# Patient Record
Sex: FEMALE | Race: WHITE | NOT HISPANIC OR LATINO | URBAN - METROPOLITAN AREA
[De-identification: names, ages, dates, MRNs, and addresses within clinical notes are randomized per-mention and may not be internally consistent; named-entity substitution may affect disease eponyms.]

---

## 2017-04-18 ENCOUNTER — TELEPHONE (OUTPATIENT)
Dept: TRANSPLANT | Facility: CLINIC | Age: 27
End: 2017-04-18

## 2017-04-18 NOTE — TELEPHONE ENCOUNTER
"Living Kidney Donor Evaluation Completed: 2017 16:50:58 CT Updated: 2017 09:37:55 CT  Donor Name: Roberta Penn MRN: Note: : 1990 Age: 26Gender: Female Donor Height: 5  7\" Weight (lb): 180 BMI: 28.2  Donor Race:  Ethnicity: Not / Donor Preferred Language: English  Required?: No Current Marital Status: Single  Demographics: Home Address: [address] [place] [address] City: [facility] State: [place] Zip: [address] Country: [place]  Best Phone: [phone]  Alt Phone: Donor Email: Best Phone Type: Mobile Alt Phone Type:   Preferred Contact Time(s): 09:00 AM-11:00 AM, 11:00 AM-1:00 PM, 1:00 PM-4:00 PM Preferred Contact Day(s): Mon, Tue, Wed, Thur, Fri  Donor Screen: PASSED Donor Referred by: Self Researched Donor self reported ABO: AB  Recipient Information: Recipient Name (Last, First): ALTRUISTIC,  ALTRUISTIC Recipient :    ... Donor Relationship: N/A Recipient Diagnosis: Recipient ABO:   MEDICAL HISTORY:  Bursitis/Tendonitis NOS  Ovarian Cysts  MEDICATIONS:  \"Loryna\"  SURGICAL HISTORY:  None Reported  ALLERGIES:  NKDA  SOCIAL HISTORY:  EtOH: Rare (1-2 drinks/month)  Illicit Drug Use: Denies  Tobacco: Denies  SELF-REPORTED FUNCTIONAL STATUS:  \"I am able to participate in strenuous sports such as swimming, singles tennis, football, basketball, or skiing\"  Exercise (3 X per week)  REVIEW OF ORGAN SYSTEMS: Airway or Lungs: No Blood Disorder: No Cancer: No Diabetes,Thyroid,Adrenal,Endocrine Disorder: No Digestive or Liver: No Female Health: Yes Heart or Circulatory System: No Immune Diseases: No Kidneys and Bladder: No Muscles,Bones,Joints: Yes Neuro: No Psych: No  FAMILY HISTORY: Confirmed:  Cancer (Aunt or Uncle, Grandparent)  Heart Disease (Grandparent)  Hypertension (Grandparent)  Denied:  Diabetes (denies)  Kidney Disease (denies)  Kidney Stones (denies)  DONOR INFORMATION:  Level of Education: College or baccalaureate degree complete Employment Status: Full Time " Employer: [employer] Medical Insurance Status: Has medical insurance Current Accommodation: Lives in rented accommodation Living Arrangement: With no relatives, residential arrangement Allow Disclosure to Recipient: Yes Paired Kidney Exchange Education Level: General idea Paired Kidney Exchange Participation Consent: Yes, for a better match Donor Motivation: Highly motivated donor  HIGH RISK BEHAVIOR:  Blood transfusion < 12 months. (NO)  Commercial sex < 12 months. (NO)  Illicit IV drug use < 5yrs. (NO)  Other high risk sexual contact < 12 months. (NO)  EMERGENCY CONTACT INFORMATION:  Primary Secondary First Name: Fang Last Name:  Phone Number: [phone] Relationship: Sibling  First Name: Bree  Last Name:  Phone Number: [phone] Relationship: Sibling  REASON FOR DONATION:   I believe everyone deserves the opportunity to live a full and healthy life.  If I'm able to help someone, I want to do so.   PHYSICIAN CONTACT INFORMATION:  PCP  Name: [facility]   City: [place] State: [place]  Phone: [phone]

## 2017-04-19 ENCOUNTER — TELEPHONE (OUTPATIENT)
Dept: TRANSPLANT | Facility: CLINIC | Age: 27
End: 2017-04-19

## 2017-04-19 NOTE — TELEPHONE ENCOUNTER
Discussed with Roberta her abo AB and understands she may not move fast but still wants to try.Pkt/orders sent.

## 2017-04-19 NOTE — TELEPHONE ENCOUNTER
Left message asking Roberta to return call for screening. Wants to be a NDD. Is abo AB so will discuss issues with that. Will now send all Phase 1 orders with NDD donor pkt.

## 2017-06-02 ENCOUNTER — DOCUMENTATION ONLY (OUTPATIENT)
Dept: TRANSPLANT | Facility: CLINIC | Age: 27
End: 2017-06-02

## 2017-06-05 ENCOUNTER — TELEPHONE (OUTPATIENT)
Dept: TRANSPLANT | Facility: CLINIC | Age: 27
End: 2017-06-05

## 2017-06-05 NOTE — TELEPHONE ENCOUNTER
Informed Roberta her Phase 1's are OK. Average VOD=657. Discussed her blood type of AB and discussed again that it may not move too fast and understands. Wants to come for eval. Has CD. [place] not left [place] in past 3 months.

## 2017-06-06 DIAGNOSIS — Z00.5 TRANSPLANT DONOR EVALUATION: ICD-10-CM

## 2017-06-12 ASSESSMENT — ENCOUNTER SYMPTOMS
ARTHRALGIAS: 1
MYALGIAS: 0
JOINT SWELLING: 0
MUSCLE WEAKNESS: 0
STIFFNESS: 0
BACK PAIN: 0
NECK PAIN: 0
MUSCLE CRAMPS: 0

## 2017-06-20 DIAGNOSIS — Z00.5 EXAMINATION OF POTENTIAL DONOR OF ORGAN AND TISSUE: Primary | ICD-10-CM

## 2017-06-22 ENCOUNTER — OFFICE VISIT (OUTPATIENT)
Dept: NEUROPSYCHOLOGY | Facility: CLINIC | Age: 27
End: 2017-06-22

## 2017-06-22 DIAGNOSIS — Z00.5 TRANSPLANT DONOR EVALUATION: ICD-10-CM

## 2017-06-22 DIAGNOSIS — Z13.31 SCREENING FOR DEPRESSION: Primary | ICD-10-CM

## 2017-06-22 NOTE — MR AVS SNAPSHOT
After Visit Summary   6/22/2017    Roberta Penn    MRN: [id number]           Patient Information     Date Of Birth          1990        Visit Information        Provider Department      6/22/2017 1:15 PM [facility]        Today's Diagnoses     Screening for depression    -  1    Transplant donor evaluation           Follow-ups after your visit        Who to contact     Please call your clinic at [phone] to:    Ask questions about your health    Make or cancel appointments    Discuss your medicines    Learn about your test results    Speak to your doctor   If you have compliments or concerns about an experience at your clinic, or if you wish to file a complaint, please contact [facility] Patient Relations at [phone] or email us at [email or web address]         Additional Information About Your Visit        MyChart Information     EmboMedicst gives you secure access to your electronic health record. If you see a primary care provider, you can also send messages to your care team and make appointments. If you have questions, please call your primary care clinic.  If you do not have a primary care provider, please call [phone] and they will assist you.      Pogoplug is an electronic gateway that provides easy, online access to your medical records. With Pogoplug, you can request a clinic appointment, read your test results, renew a prescription or communicate with your care team.     To access your existing account, please contact your [facility] or call [phone] for assistance.        Care EveryWhere ID     This is your Care EveryWhere ID. This could be used by other organizations to access your [place] medical records  [id number]         Blood Pressure from Last 3 Encounters:   06/23/17 120/78   06/23/17 120/84   06/23/17 122/72    Weight from Last 3 Encounters:   06/23/17 88.4 kg  (194 lb 14.4 oz)              We Performed the Following     [id number] TEST BY PSYCHOLOGIST/MD, PER HR     C PSYCHIATRIC DIAGNOSTIC EVALUATION     PSYCHOLOGY REFERRAL        Primary Care Provider    Provider Unknown       No address on file        Equal Access to Services     JOVANNYEMMA AYLA : Hadii aad ku hadmaishatim Ginarashmi, dinah toñitotomy, lazaro kacarli yessilance, elizabeth gustavoin hayaaanshu dicksonrob connor laVinevelio stern. So [facility] [phone].    ATENCIÓN: Si habla español, tiene a zhao disposición servicios gratuitos de asistencia lingüística. Llame al [phone].    We comply with applicable federal civil rights laws and [place] laws. We do not discriminate on the basis of race, color, national origin, age, disability sex, sexual orientation or gender identity.            Thank you!     Thank you for choosing [facility] NEUROPSYCHOLOGY  for your care. Our goal is always to provide you with excellent care. Hearing back from our patients is one way we can continue to improve our services. Please take a few minutes to complete the written survey that you may receive in the mail after your visit with us. Thank you!             Your Updated Medication List - Protect others around you: Learn how to safely use, store and throw away your medicines at www.disposemymeds.org.      Notice  As of 6/22/2017 11:59 PM    You have not been prescribed any medications.

## 2017-06-23 ENCOUNTER — INFUSION THERAPY VISIT (OUTPATIENT)
Dept: INFUSION THERAPY | Facility: CLINIC | Age: 27
End: 2017-06-23
Attending: INTERNAL MEDICINE

## 2017-06-23 ENCOUNTER — ALLIED HEALTH/NURSE VISIT (OUTPATIENT)
Dept: TRANSPLANT | Facility: CLINIC | Age: 27
End: 2017-06-23
Attending: TRANSPLANT SURGERY

## 2017-06-23 ENCOUNTER — OFFICE VISIT (OUTPATIENT)
Dept: NEPHROLOGY | Facility: CLINIC | Age: 27
End: 2017-06-23
Attending: TRANSPLANT SURGERY

## 2017-06-23 ENCOUNTER — OFFICE VISIT (OUTPATIENT)
Dept: TRANSPLANT | Facility: CLINIC | Age: 27
End: 2017-06-23
Attending: TRANSPLANT SURGERY

## 2017-06-23 VITALS
WEIGHT: 194.9 LBS | HEIGHT: 67 IN | RESPIRATION RATE: 20 BRPM | BODY MASS INDEX: 30.59 KG/M2 | TEMPERATURE: 98.6 F | OXYGEN SATURATION: 97 % | HEART RATE: 100 BPM | DIASTOLIC BLOOD PRESSURE: 72 MMHG | SYSTOLIC BLOOD PRESSURE: 122 MMHG

## 2017-06-23 VITALS — DIASTOLIC BLOOD PRESSURE: 84 MMHG | SYSTOLIC BLOOD PRESSURE: 120 MMHG

## 2017-06-23 VITALS — SYSTOLIC BLOOD PRESSURE: 120 MMHG | DIASTOLIC BLOOD PRESSURE: 78 MMHG

## 2017-06-23 DIAGNOSIS — Z00.5 TRANSPLANT DONOR EVALUATION: ICD-10-CM

## 2017-06-23 DIAGNOSIS — Z00.5 TRANSPLANT DONOR EVALUATION: Primary | ICD-10-CM

## 2017-06-23 DIAGNOSIS — Z00.5 EXAMINATION OF POTENTIAL DONOR OF ORGAN AND TISSUE: Primary | ICD-10-CM

## 2017-06-23 DIAGNOSIS — E66.811 OBESITY (BMI 30.0-34.9): ICD-10-CM

## 2017-06-23 DIAGNOSIS — Z00.5 EXAMINATION OF POTENTIAL DONOR OF ORGAN AND TISSUE: ICD-10-CM

## 2017-06-23 LAB
ABO + RH BLD: NORMAL
ABO + RH BLD: NORMAL
ALBUMIN SERPL-MCNC: 3.2 G/DL (ref 3.4–5)
ALBUMIN UR-MCNC: NEGATIVE MG/DL
ALP SERPL-CCNC: 68 U/L (ref 40–150)
ALT SERPL W P-5'-P-CCNC: 18 U/L (ref 0–50)
ANION GAP SERPL CALCULATED.3IONS-SCNC: 10 MMOL/L (ref 3–14)
APPEARANCE UR: ABNORMAL
APTT PPP: 26 SEC (ref 22–37)
AST SERPL W P-5'-P-CCNC: 16 U/L (ref 0–45)
B-HCG SERPL-ACNC: <1 IU/L (ref 0–5)
BACTERIA #/AREA URNS HPF: ABNORMAL /HPF
BILIRUB SERPL-MCNC: 0.5 MG/DL (ref 0.2–1.3)
BILIRUB UR QL STRIP: NEGATIVE
BLD GP AB SCN SERPL QL: NORMAL
BLOOD BANK CMNT PATIENT-IMP: NORMAL
BUN SERPL-MCNC: 13 MG/DL (ref 7–30)
CALCIUM SERPL-MCNC: 8.3 MG/DL (ref 8.5–10.1)
CHLORIDE SERPL-SCNC: 106 MMOL/L (ref 94–109)
CHOLEST SERPL-MCNC: 162 MG/DL
CMV IGG SERPL QL IA: NORMAL AI (ref 0–0.8)
CO2 SERPL-SCNC: 20 MMOL/L (ref 20–32)
COLOR UR AUTO: YELLOW
CREAT SERPL-MCNC: 0.54 MG/DL (ref 0.52–1.04)
CREAT UR-MCNC: 249 MG/DL
EBV VCA IGG SER QL IA: ABNORMAL AI (ref 0–0.8)
EBV VCA IGM SER QL IA: NORMAL AI (ref 0–0.8)
ERYTHROCYTE [DISTWIDTH] IN BLOOD BY AUTOMATED COUNT: 13 % (ref 10–15)
GFR SERPL CREATININE-BSD FRML MDRD: ABNORMAL ML/MIN/1.7M2
GLUCOSE SERPL-MCNC: 98 MG/DL (ref 70–99)
GLUCOSE UR STRIP-MCNC: NEGATIVE MG/DL
HBA1C MFR BLD: 5.6 % (ref 4.3–6)
HBV CORE AB SERPL QL IA: NONREACTIVE
HBV SURFACE AB SERPL IA-ACNC: 33.14 M[IU]/ML
HBV SURFACE AG SERPL QL IA: NONREACTIVE
HCT VFR BLD AUTO: 38.8 % (ref 35–47)
HCV AB SERPL QL IA: NORMAL
HDLC SERPL-MCNC: 71 MG/DL
HGB BLD-MCNC: 12.4 G/DL (ref 11.7–15.7)
HGB UR QL STRIP: NEGATIVE
HIV 1+2 AB+HIV1 P24 AG SERPL QL IA: NORMAL
INR PPP: 1.01 (ref 0.86–1.14)
KETONES UR STRIP-MCNC: NEGATIVE MG/DL
LDLC SERPL CALC-MCNC: 53 MG/DL
LEUKOCYTE ESTERASE UR QL STRIP: ABNORMAL
MCH RBC QN AUTO: 28 PG (ref 26.5–33)
MCHC RBC AUTO-ENTMCNC: 32 G/DL (ref 31.5–36.5)
MCV RBC AUTO: 88 FL (ref 78–100)
MICROALBUMIN UR-MCNC: 12 MG/L
MICROALBUMIN/CREAT UR: 4.9 MG/G CR (ref 0–25)
MUCOUS THREADS #/AREA URNS LPF: PRESENT /LPF
NITRATE UR QL: NEGATIVE
NONHDLC SERPL-MCNC: 91 MG/DL
PH UR STRIP: 5 PH (ref 5–7)
PHOSPHATE SERPL-MCNC: 3 MG/DL (ref 2.5–4.5)
PLATELET # BLD AUTO: 256 10E9/L (ref 150–450)
POTASSIUM SERPL-SCNC: 3.9 MMOL/L (ref 3.4–5.3)
PROT SERPL-MCNC: 7.4 G/DL (ref 6.8–8.8)
PROT UR-MCNC: 0.12 G/L
PROT/CREAT 24H UR: 0.05 G/G CR (ref 0–0.2)
RBC # BLD AUTO: 4.43 10E12/L (ref 3.8–5.2)
RBC #/AREA URNS AUTO: 3 /HPF (ref 0–2)
SODIUM SERPL-SCNC: 136 MMOL/L (ref 133–144)
SP GR UR STRIP: 1.03 (ref 1–1.03)
SPECIMEN EXP DATE BLD: NORMAL
SQUAMOUS #/AREA URNS AUTO: 2 /HPF (ref 0–1)
T PALLIDUM IGG+IGM SER QL: NEGATIVE
TRIGL SERPL-MCNC: 187 MG/DL
URATE SERPL-MCNC: 5.3 MG/DL (ref 2.6–6)
URN SPEC COLLECT METH UR: ABNORMAL
UROBILINOGEN UR STRIP-MCNC: 0 MG/DL (ref 0–2)
WBC # BLD AUTO: 8.1 10E9/L (ref 4–11)
WBC #/AREA URNS AUTO: 6 /HPF (ref 0–2)

## 2017-06-23 PROCEDURE — 86704 HEP B CORE ANTIBODY TOTAL: CPT | Performed by: INTERNAL MEDICINE

## 2017-06-23 PROCEDURE — 84550 ASSAY OF BLOOD/URIC ACID: CPT | Performed by: INTERNAL MEDICINE

## 2017-06-23 PROCEDURE — 84156 ASSAY OF PROTEIN URINE: CPT | Performed by: INTERNAL MEDICINE

## 2017-06-23 PROCEDURE — 86850 RBC ANTIBODY SCREEN: CPT | Performed by: INTERNAL MEDICINE

## 2017-06-23 PROCEDURE — 80053 COMPREHEN METABOLIC PANEL: CPT | Performed by: INTERNAL MEDICINE

## 2017-06-23 PROCEDURE — 87340 HEPATITIS B SURFACE AG IA: CPT | Performed by: INTERNAL MEDICINE

## 2017-06-23 PROCEDURE — 84100 ASSAY OF PHOSPHORUS: CPT | Performed by: INTERNAL MEDICINE

## 2017-06-23 PROCEDURE — 81001 URINALYSIS AUTO W/SCOPE: CPT | Performed by: INTERNAL MEDICINE

## 2017-06-23 PROCEDURE — 86480 TB TEST CELL IMMUN MEASURE: CPT | Performed by: INTERNAL MEDICINE

## 2017-06-23 PROCEDURE — 84702 CHORIONIC GONADOTROPIN TEST: CPT | Performed by: INTERNAL MEDICINE

## 2017-06-23 PROCEDURE — 85610 PROTHROMBIN TIME: CPT | Performed by: INTERNAL MEDICINE

## 2017-06-23 PROCEDURE — 86803 HEPATITIS C AB TEST: CPT | Performed by: INTERNAL MEDICINE

## 2017-06-23 PROCEDURE — 82542 COL CHROMOTOGRAPHY QUAL/QUAN: CPT | Performed by: INTERNAL MEDICINE

## 2017-06-23 PROCEDURE — 40000269 ZZH STATISTIC NO CHARGE FACILITY FEE: Mod: ZF

## 2017-06-23 PROCEDURE — 83036 HEMOGLOBIN GLYCOSYLATED A1C: CPT | Performed by: INTERNAL MEDICINE

## 2017-06-23 PROCEDURE — 85730 THROMBOPLASTIN TIME PARTIAL: CPT | Performed by: INTERNAL MEDICINE

## 2017-06-23 PROCEDURE — 86900 BLOOD TYPING SEROLOGIC ABO: CPT | Performed by: INTERNAL MEDICINE

## 2017-06-23 PROCEDURE — 85027 COMPLETE CBC AUTOMATED: CPT | Performed by: INTERNAL MEDICINE

## 2017-06-23 PROCEDURE — 80061 LIPID PANEL: CPT | Performed by: INTERNAL MEDICINE

## 2017-06-23 PROCEDURE — 86665 EPSTEIN-BARR CAPSID VCA: CPT | Performed by: INTERNAL MEDICINE

## 2017-06-23 PROCEDURE — 36415 COLL VENOUS BLD VENIPUNCTURE: CPT | Performed by: INTERNAL MEDICINE

## 2017-06-23 PROCEDURE — 86644 CMV ANTIBODY: CPT | Performed by: INTERNAL MEDICINE

## 2017-06-23 PROCEDURE — 82043 UR ALBUMIN QUANTITATIVE: CPT | Performed by: INTERNAL MEDICINE

## 2017-06-23 PROCEDURE — 86780 TREPONEMA PALLIDUM: CPT | Performed by: INTERNAL MEDICINE

## 2017-06-23 PROCEDURE — 40000866 ZZHCL STATISTIC HIV 1/2 ANTIGEN/ANTIBODY PRETRANSPLANT ONLY: Performed by: INTERNAL MEDICINE

## 2017-06-23 PROCEDURE — 86901 BLOOD TYPING SEROLOGIC RH(D): CPT | Performed by: INTERNAL MEDICINE

## 2017-06-23 PROCEDURE — 25000128 H RX IP 250 OP 636: Mod: JW,ZF | Performed by: INTERNAL MEDICINE

## 2017-06-23 PROCEDURE — 86706 HEP B SURFACE ANTIBODY: CPT | Performed by: INTERNAL MEDICINE

## 2017-06-23 RX ORDER — DROSPIRENONE AND ETHINYL ESTRADIOL 0.02-3(28)
1 KIT ORAL DAILY
COMMUNITY

## 2017-06-23 RX ADMIN — IOHEXOL 5 ML: 300 INJECTION, SOLUTION INTRAVENOUS at 09:12

## 2017-06-23 ASSESSMENT — PAIN SCALES - GENERAL: PAINLEVEL: NO PAIN (0)

## 2017-06-23 NOTE — PROGRESS NOTES
RE: Roberta Penn  [facility] #[id number]             I saw your patient, Roberta Penn, in consultation in our pretransplant clinic.  She was here at clinic for evaluation as a possible kidney donor.  She presented to clinic with her mother. Our donor coordinator shared our center-specific results with her.  We discussed:    (1) The risks of donation--including mortality (0.03% risk) and morbidity (approximately 1% risk of major morbidity).  We discussed the major reported causes of death after kidney donation (bleeding, infection, pulmonary embolism).  We discussed potential complications:  bleeding requiring reoperation, infection requiring reoperation, pneumonia, bowel obstruction, infection at the site of the incision, hernia at the site of the incision, deep vein thrombosis, deep vein thrombosis with associated pulmonary embolism, and urinary tract infection.  I told her I could not possibly name all of the risks, but that she was at risk for any complications that could occur in any operation.     We also discussed the long-term risks of living with one kidney.  We discussed the rare diseases that might affect only one kidney.  We talked about the new publications suggesting slightly increased long-term risk of ESRD after donor nephrectomy.  For people her age, there is really no data to show what the outcome will be at 50-60 years.  This is an important consideration.  We discussed the rare diseases that might affect having only one kidney.    (2) The hospital stay and the fact that if all goes well, discharge would occur within two to three days after donor nephrectomy.  We also discussed the fact that there would be no diet or fluid restrictions (again if all went well), and that the only new medication that he would be on would be pain medication.  We discussed the fact that most patients are on Tylenol or something similar within five to six days of surgery.      (3) The recovery time after surgery and the  "restrictions that are necessary: a) no driving for a couple of weeks (or until she felt that his/her reaction would be adequate if she had to slam on the brakes; and b) no lifting over 10 pounds or any exercise that would stretch her abdominal muscles for six weeks (to allow the muscles to heal so that she doesn't develop a hernia).  We also discussed return to work, which could occur in approximately three to four weeks if she had a desk job or would require not returning to work for six weeks if the job required any heavy lifting or exercise.  We discussed the potential for not getting her pep and energy back for anywhere from two to six weeks after surgery and how there was a tremendous individual variation in return of full energy level.    (4) The concern about long distance travel for the first couple of weeks post-donation.  We discussed the risks of deep vein thrombosis associated with plane or car travel.  I recommended that, if flying, she should get up and walk around every 30-40 minutes; if driving she should ask the  to stop the car every 30-45 minutes so Ms. Penn could take a short walk.    (5) The fact that the recipient could be on a waiting for  donor transplant and would ultimately receive a kidney if Ms. Penn did not donate.      (6) The difference between nondirected and directed donors.  I explained our policy of allocation of nondirected kidneys to the number one person on the list.  I also explained that by being a nondirected donor, they would not have the opportunity to meet the recipient (at least for the first six months) and, therefore, would not have the opportunity to see the potential \"benefit\" of the donation.  I also mentioned that our recipients often have not sent \"thank-you\" letters to their nondirected donors.  This would be an important thing to recognize as she went forward in thinking about nondirected donation.  Finally, we discussed the option of entering the " paired exchange pool--its advantage, increasing the number of transplants from the donation; the disadvantage, harder to schedule and more logistics.     We also discussed potential risks associated with future pregnancies.  We discussed the literature about pregnancy after donation, and also our local data.  I told her that data showed that the risks of post donation pregnancies were similar to the risks reported in the general population.  I also discussed our data on outcomes of pregnancies in women who had had pregnancies both pre- and post-donation.    Finally, the mother had numerous questions about the risks: the long-term outcome, the survival and rejection rate of transplantation in general, and the income of donor age on longevity of a transplant. I answered these as well as other questions.     I attempted to answer any remaining questions.  I also told her that should she have any questions, she should feel free to contact us.  We would be glad to answer any questions either over the phone or at another clinic visit.  Her transplant coordinator is Marti Shin and may be reached at [phone].  Thank you for the opportunity to see her.    I spent 70 minutes with this patient.  Over 95% of that time was spent in counseling and coordination of care.             Yours truly,               Melecio Marrero MD         Professor of Surgery         ([phone])      KRISS/

## 2017-06-23 NOTE — PROGRESS NOTES
Patient attended all appointments and completed all scheduled tests.  Patient to follow up with Transplant Coordinator.  Patient stated understanding and has contact numbers.    +4hour Iohexol was drawn at 1335. Was due at 1320. Unable to obtain blood from IV site. Lab called and blood draw done from this patients hand.

## 2017-06-23 NOTE — MR AVS SNAPSHOT
After Visit Summary   6/23/2017    Roberta Penn    MRN: [id number]           Patient Information     Date Of Birth          1990        Visit Information        Provider Department      6/23/2017 8:00 AM  TXC EVALUATION [facility] Solid Organ Transplant        Today's Diagnoses     Examination of potential donor of organ and tissue    -  1       Follow-ups after your visit        Your next 10 appointments already scheduled     Jun 23, 2017  2:15 PM CDT   (Arrive by 2:00 PM)   XR CHEST 2 VIEWS with UCXR1   [facility] Xray ([facility])    [address] [address]   [phone]           Please bring a list of your current medicines to your exam. (Include vitamins, minerals and over-thecounter medicines.) Leave your valuables at home.  Tell your doctor if there is a chance you may be pregnant.  You do not need to do anything special for this exam.            Jun 23, 2017  2:40 PM CDT   (Arrive by 2:25 PM)   CT RENAL ANGIO with UCCT1   [facility] CT ([facility])    [address] [address] [phone]           Please bring any scans or X-rays taken at other hospitals, if similar tests were done. Also bring a list of your medicines, including vitamins, minerals and over-the-counter drugs. It is safest to leave personal items at home.  Be sure to tell your doctor:   If you have any allergies.   If there s any chance you are pregnant.   If you are breastfeeding.   If you have any special needs.  You will have contrast for this exam. To prepare:   Do not eat or drink for 2 hours before your exam. If you need to take medicine, you may take it with small sips of water. (We may ask you to take liquid medicine as well.)   The day before your exam, drink extra fluids at least six 8-ounce glasses (unless your doctor tells you to restrict your fluids).  Patients over  70 or patients with diabetes or kidney problems:   If you haven t had a blood test (creatinine test) within the last 30 days, go to your clinic or Diagnostic Imaging Department for this test.  If you have diabetes:   If your kidney function is normal, continue taking your metformin (Avandamet, Glucophage, Glucovance, Metaglip) on the day of your exam.   If your kidney function is abnormal, wait 48 hours before restarting this medicine.  Please wear loose clothing, such as a sweat suit or jogging clothes. Avoid snaps, zippers and other metal. We may ask you to undress and put on a hospital gown.  If you have any questions, please call the Imaging Department where you will have your exam.              Who to contact     If you have questions or need follow up information about today's clinic visit or your schedule please contact [facility] SOLID ORGAN TRANSPLANT directly at [phone].  Normal or non-critical lab and imaging results will be communicated to you by ApiFixhart, letter or phone within 4 business days after the clinic has received the results. If you do not hear from us within 7 days, please contact the clinic through TechProcess Solutionst or phone. If you have a critical or abnormal lab result, we will notify you by phone as soon as possible.  Submit refill requests through Ameristream or call your pharmacy and they will forward the refill request to us. Please allow 3 business days for your refill to be completed.          Additional Information About Your Visit        ApiFixharWattpad Information     Ameristream gives you secure access to your electronic health record. If you see a primary care provider, you can also send messages to your care team and make appointments. If you have questions, please call your primary care clinic.  If you do not have a primary care provider, please call [phone] and they will assist you.        Care EveryWhere ID     This is your Care EveryWhere ID. This could be used by other organizations to  "access your [place] medical records  [id number]        Your Vitals Were     Pulse Temperature Respirations Height Pulse Oximetry BMI (Body Mass Index)    100 98.6  F (37  C) (Oral) 20 1.702 m (5' 7\") 97% 30.53 kg/m2       Blood Pressure from Last 3 Encounters:   06/23/17 120/78   06/23/17 120/84   06/23/17 122/72    Weight from Last 3 Encounters:   06/23/17 88.4 kg (194 lb 14.4 oz)              Today, you had the following     No orders found for display       Primary Care Provider    Provider Unknown       No address on file        Equal Access to Services     [facility]: Hadii cori Vicente, washreyada ilya, lazaro kaalmada pao, elizabeth herron . So [facility] [phone].    ATENCIÓN: Si habla español, tiene a zhao disposición servicios gratuitos de asistencia lingüística. Llame al [phone].    We comply with applicable federal civil rights laws and [place] laws. We do not discriminate on the basis of race, color, national origin, age, disability sex, sexual orientation or gender identity.            Thank you!     Thank you for choosing [facility] SOLID ORGAN TRANSPLANT  for your care. Our goal is always to provide you with excellent care. Hearing back from our patients is one way we can continue to improve our services. Please take a few minutes to complete the written survey that you may receive in the mail after your visit with us. Thank you!             Your Updated Medication List - Protect others around you: Learn how to safely use, store and throw away your medicines at www.disposemymeds.org.          This list is accurate as of: 6/23/17  2:04 PM.  Always use your most recent med list.                   Brand Name Dispense Instructions for use Diagnosis    drospirenone-ethinyl estradiol 3-0.02 MG per tablet    SEAMUS     Take 1 tablet by mouth daily          "

## 2017-06-23 NOTE — MR AVS SNAPSHOT
After Visit Summary   6/23/2017    Roberta Penn    MRN: [id number]           Patient Information     Date Of Birth          1990        Visit Information        Provider Department      6/23/2017 12:30 PM Marti Shin S.A., RN [facility] Solid Organ Transplant        Today's Diagnoses     Examination of potential donor of organ and tissue    -  1       Follow-ups after your visit        Who to contact     If you have questions or need follow up information about today's clinic visit or your schedule please contact [facility] SOLID ORGAN TRANSPLANT directly at [phone].  Normal or non-critical lab and imaging results will be communicated to you by EoeMobilehart, letter or phone within 4 business days after the clinic has received the results. If you do not hear from us within 7 days, please contact the clinic through CipherAppst or phone. If you have a critical or abnormal lab result, we will notify you by phone as soon as possible.  Submit refill requests through BYTEGRID or call your pharmacy and they will forward the refill request to us. Please allow 3 business days for your refill to be completed.          Additional Information About Your Visit        MyChart Information     BYTEGRID gives you secure access to your electronic health record. If you see a primary care provider, you can also send messages to your care team and make appointments. If you have questions, please call your primary care clinic.  If you do not have a primary care provider, please call [phone] and they will assist you.        Care EveryWhere ID     This is your Care EveryWhere ID. This could be used by other organizations to access your [place] medical records  [id number]         Blood Pressure from Last 3 Encounters:   06/23/17 120/78   06/23/17 120/84   06/23/17 122/72    Weight from Last 3 Encounters:   06/23/17 88.4 kg (194 lb 14.4 oz)              Today, you had the following     No orders found for display        Primary Care Provider    Provider Unknown       No address on file        Equal Access to Services     [facility] AGUILA : Hadii aad ku hadmaishatim Vicente, markelpidio caraballo, krystenshannon fajardomaelizabeth ugaldeshakiraspenser stern. So [facility] [phone].    ATENCIÓN: Si habla español, tiene a zhao disposición servicios gratuitos de asistencia lingüística. Llame al [phone].    We comply with applicable federal civil rights laws and [place] laws. We do not discriminate on the basis of race, color, national origin, age, disability sex, sexual orientation or gender identity.            Thank you!     Thank you for choosing [facility] SOLID ORGAN TRANSPLANT  for your care. Our goal is always to provide you with excellent care. Hearing back from our patients is one way we can continue to improve our services. Please take a few minutes to complete the written survey that you may receive in the mail after your visit with us. Thank you!             Your Updated Medication List - Protect others around you: Learn how to safely use, store and throw away your medicines at www.disposemymeds.org.          This list is accurate as of: 6/23/17  3:19 PM.  Always use your most recent med list.                   Brand Name Dispense Instructions for use Diagnosis    drospirenone-ethinyl estradiol 3-0.02 MG per tablet    SEAMUS     Take 1 tablet by mouth daily

## 2017-06-23 NOTE — PROGRESS NOTES
NUTRITION KIDNEY DONOR EVALUATION  Medical Nutrition Therapy    Weight and BMI:  Current BMI: 30.5  BMI is outside criteria for kidney donation  BMI Goal <30  Current Weight: 194 lbs  Goal Weight <190 lbs  WEIGHT LOSS NEEDED for BMI <30: 4 lbs    8 Year Risk of Diabetes:  </= 3%       Visit Type: Initial Assessment    Roberta Penn referred by Dr. Marrero for MNT related to potential kidney donor evaluation    Patient accompanied by self    Nutrition Assessment:  Anthropometrics  Height:   67  in   BMI:    30.5    Weight Status:Obesity Grade I BMI 30-34.9   Weight:  194 lbs            IBW (lbs): 135  % IBW: 144    Wt Hx: Pt has gained 50# x 1 year by exercising less.     Adj/dosing BW: 150 lbs/68 kg    Goal wt prior to donation: 190 lbs  (BMI <30 or per MD)          Recent Labs   Lab Test  06/23/17   0831   CHOL  162   HDL  71   LDL  53   TRIG  187*       BG = 98  A1C = 5.6      Prediction of Incident Diabetes Mellitus in Middle-aged Adults: The [place] Offspring Study  Olvin Sifuentes MD; James B. Meigs, MD, MPH; Tashia Guerrero, PhD; Benita Sevilla MD, MPH; Freddy Dickerson MD; Hansel Falcon Sr,   PhD  Pt's estimated risk for T2DM (per Table 6 above)  Pt received points for the following criteria: BMI>25, BMI>30, TG>150  Total points: 10  8-Year risk of T2DM: </= 3%    Nutrition History  Dining out/food not made at home: 3-4x/week  Vitamins, Supplements, Herbals, Pertinent Meds: none  Pt eats very minimal meat (chix, beef, fish, pork), minimal eggs. Most protein is from yogurt, milk, beans, nuts.     Recall:  B: yogurt  L: fruit or salad  D: [employer] food, salad; may have rice/beans at home  Sn: apple, almonds, granola bars  Beverages: water, milk, sparkling water, V8 tomato juice, coffee, occasional energy drinks  ETOH (1 drink = 12 oz beer, 5 oz wine, 1.5 oz liquor): 1 drink/week     Physical Activity  Walking, starting jogging/walking combo; prev used to play tennis and run but also has joint  issues      Nutrition Prescription  Estimated Energy Needs: 6300-8575 kcals (20-25 Kcal/Kg)  Justification: obese    Estimated Protein Needs: 54-68 protein (0.8-1 g pro/Kg)  Justification: maintenance    Estimated Fluid Needs:  (1 mL/Kcal)  Justification: maintenance     Fiber: 25-30 g/day     Nutrition Diagnosis:  Obesity r/t excessive energy intake and inadequate physical activity AEB BMI >30.     Excessive Na+ intake r/t food and nutrition related knowledge deficit AEB diet recall reveals high Na+ foods.    Nutrition Intervention:  Nutrition education provided:  Discussed strategies for wt loss - encouraged increased activity. Reviewed high TG and likely d/t higher carb intake. Provided goal range for protein intake and discussed non-meat protein foods and encouraged trying protein shakes or bars daily to help with weight loss.   Reviewed overall healthy diet guidelines for pre and post kidney donation. Discussed maintenance of a healthy weight, Na+ intake <3000 mg/day, and avoidance of herbal supplements post donation d/t unknown effects on the kidney. Reviewed that current diet is likely ~3000 mg sodium with amount of eating out she does from the deli.     Patient Understanding: Pt verbalized understanding of education provided.  Expected Compliance: Good  Follow-Up Plans: PRN     Nutrition Goals:  1. Weight loss to BMI<30 or <190 lbs or more  2. Na+ <3000 mg/day    Provided pt with contact info.    Time spent with patient: 15 minutes.  Sapna Emery, RD, LD

## 2017-06-23 NOTE — LETTER
6/23/2017       RE: Roberta Penn  [address]  [facility] [address]     Dear Colleague,    Thank you for referring your patient, Roberta Penn, to the [facility] NEPHROLOGY at [facility]. Please see a copy of my visit note below.        Assessment and Plan: 27 yo F who would like to be a nondirected donor.  Takes OCPs for PCOS but no other medications.  Good BP and normal renal function.  Overall a good candidate for donation pending angio results. Issues to address  1.  Obesity.  She has had significant weight gain in one year. Recommend sustained weight loss 15-20 pounds for 6 months prior to donation    Discussed the risks of donating a kidney, including the surgical risk and the possible risks of living with one kidney. Discussed increased risk of preeclampsia.  Pt does not want to become pregnant but says she will consider this.    Education about expected post-donation kidney function and how chronic kidney disease (CKD) and end stage kidney disease (ESKD) might potentially impact the donor in the future, include, but not limited to:       - On average, donors will have 25-35% permanent loss of kidney function at donation.       - Baseline risk of ESKD may slightly exceed that of members of the general population with the same demographic profile.       - Donor risks must be interpreted in light of known epidemiology of both CKD or ESKD, such as that CKD generally develops in midlife (40-50 years old) and ESKD generally develops after age 60.       - Medical evaluation of young potential donors cannot predict lifetime risk of CKD or ESKD.       - Donors may be at higher risk for CKD if they sustain damage to the remaining kidney.       - Development of CKD and progression of ESKD may be more rapid with only 1 kidney.       - Some type of kidney replacement therapy, either kidney transplant or dialysis, is required when reaching ESKD.    Potential medical or surgical  risks include, but not limited to:       - Death.       - Scars, pain, fatigue, and other consequences typical of any surgical procedure.       - Decreased kidney function.       - Abdominal or bowel symptoms, such as bloating and nausea, and developing bowel obstruction.       - Kidney failure (ESKD) and the need for a kidney transplant or dialysis for the donor.       - Impact of obesity, hypertension, or other donor-specific medical conditions on morbidity and mortality of the potential donor.    Patients overall evaluation will be discussed with the transplant team and a final recommendation on the patients' suitability to be a kidney transplant donor will be made at that time.    Consult:  Roberta Penn was seen in consultation at the request of Dr. Marrero for evaluation as a potential kidney transplant donor.    Reason for Visit:  Roberta Penn is a 26 year old female who presents for a kidney donor evaluation.  Patient would like to donate to nondirected donor.    HPI:    Patient is a healthy 27 yo F who learned about donation on line.  She takes no medications. Her only health problem is recent weight gain of 50 pounds in past year.  She used to be a distance runner but has stopped exercising because she is busy at work.         Kidney Disease Hx:       h/o Kidney Problems: No  Family h/o Genetic Kidney Disease: No       h/o HTN: No    Usual BP: 115/70     h/o Protein in Urine: No  h/o Blood in Urine: No       h/o Kidney Stones: No  h/o Kidney Injury: No       h/o Recurrent UTI: No  h/o Genitourinary Problems: No       h/o Chronic NSAID Use: No         Other Medical Hx:       h/o DM: No   h/o Gestational DM: No       h/o Preeclampsia: No          h/o Gastrointestinal, Pancreas or Liver Problems: No       h/o Lung or Heart Problems: No       h/o Hematologic Problems: No  h/o Bleeding or Clotting Problems: No       h/o Cancer: No       h/o Infection Problems: No         Skin Cancer Risk:       h/o more than 50  moles: No       h/o extensive sun exposure: No       h/o melanoma: No       Family h/o melanoma: No         Mental Health Assessment:       h/o Depression: No       h/o Psychiatric Illness: No       h/o Suicidal Attempt(s): No    ROS:   A comprehensive review of systems was obtained and negative, except as noted in the HPI or PMH.    PMH:   History was taken from the patient as noted below.  Past Medical History:   Diagnosis Date     Bursitis 2009     Obesity (BMI 30.0-34.9) 2017    weight gain 50 lb in past one year     Polycystic ovary syndrome 2012    treated with OCPs       PSH:   Past Surgical History:   Procedure Laterality Date     HC TOOTH EXTRACTION W/FORCEP  2002    [place] teeth     Personal or family history of anesthesia problems: No    Family Hx:   Family History   Problem Relation Age of Onset     Pancreatic Cancer Maternal Grandmother      Breast Cancer Maternal Grandmother      Colon Cancer Maternal Grandmother      Hypertension Maternal Grandfather      Obesity Maternal Grandfather      Hypertension Paternal Grandmother           Specific Family Hx:       FH of DM: No       FH of CAD: Yes   FH of HTN: Yes        FH of Cancer: No  FH of Kidney Cancer: No    Personal Hx:   Social History     Social History     Marital status: Single     Spouse name: N/A     Number of children: N/A     Years of education: N/A     Occupational History     Not on file.     Social History Main Topics     Smoking status: Never Smoker     Smokeless tobacco: Not on file     Alcohol use 0.6 oz/week     1 Standard drinks or equivalent per week     Drug use: No     Sexual activity: Not on file     Other Topics Concern     Not on file     Social History Narrative          Specific Social Hx:       Health Insurance Status: Yes       Employment Status: Full time  Occupation: Employed at [employer]                       Living Arrangements:don't know       Social Support: Yes       Presence of increased risk for disease  "transmission behaviors as defined by [facility] guidelines: No        Allergies:  No Known Allergies    Medications:  Prior to Admission medications    Medication Sig Start Date End Date Taking? Authorizing Provider   drospirenone-ethinyl estradiol (SEAMUS) 3-0.02 MG per tablet Take 1 tablet by mouth daily    Reported, Patient       Vitals:  Vital Signs 6/23/2017 6/23/2017 6/23/2017   Systolic 122 120 120   Diastolic 72 78 84   Pulse 100 - -   Temperature 98.6 - -   Respirations 20 - -   Weight (LB) 194 lb 14.4 oz - -   Height 5' 7\" - -   BMI (Calculated) 30.59 - -   Pain - - -   O2 97 - -       Exam:   GENERAL APPEARANCE: alert and no distress  HENT: mouth without ulcers or lesions  LYMPHATICS: no cervical or supraclavicular nodes  RESP: lungs clear to auscultation - no rales, rhonchi or wheezes  CV: regular rhythm, normal rate, no rub, no murmur  EDEMA: no LE edema bilaterally  ABDOMEN: soft, nondistended, nontender, bowel sounds normal. No bruits over kidneys  MS: extremities normal - no gross deformities noted, no evidence of inflammation in joints, no muscle tenderness  SKIN: no rash    Results:   Labs and imaging were ordered for this visit and reviewed by me.  Recent Results (from the past 336 hour(s))   ABO/Rh type and screen    Collection Time: 06/23/17  8:31 AM   Result Value Ref Range    ABO AB     RH(D)  Neg     Antibody Screen Neg     Test Valid Only At       [facility]    Specimen Expires 06/26/2017    CMV Antibody IgG    Collection Time: 06/23/17  8:31 AM   Result Value Ref Range    CMV Antibody IgG  0.0 - 0.8 AI     <0.2  Negative   Antibody index (AI) values reflect qualitative changes in antibody   concentration that cannot be directly associated with clinical condition or   disease state.     EBV Capsid Antibody IgG    Collection Time: 06/23/17  8:31 AM   Result Value Ref Range    EBV Capsid Antibody IgG (H) 0.0 - 0.8 AI     >8.0  Positive, suggests recent or " past exposure   Antibody index (AI) values reflect qualitative changes in antibody   concentration that cannot be directly associated with clinical condition or   disease state.     Hepatitis B core antibody    Collection Time: 06/23/17  8:31 AM   Result Value Ref Range    Hepatitis B Core Caty Nonreactive NR   Hepatitis B Surface Antibody    Collection Time: 06/23/17  8:31 AM   Result Value Ref Range    Hepatitis B Surface Antibody 33.14 (H) <8.00 m[IU]/mL   Hepatitis B surface antigen    Collection Time: 06/23/17  8:31 AM   Result Value Ref Range    Hep B Surface Agn Nonreactive NR   Hepatitis C antibody    Collection Time: 06/23/17  8:31 AM   Result Value Ref Range    Hepatitis C Antibody  NR     Nonreactive   Assay performance characteristics have not been established for newborns,   infants, and children     HIV Antigen Antibody Combo Pretransplant    Collection Time: 06/23/17  8:31 AM   Result Value Ref Range    HIV Antigen Antibody Combo Pretransplant  NR     Nonreactive   HIV-1 p24 Ag & HIV-1/HIV-2 Ab Not Detected     Anti Treponema    Collection Time: 06/23/17  8:31 AM   Result Value Ref Range    Treponema pallidum Antibody Negative NEG   Lipid Profile    Collection Time: 06/23/17  8:31 AM   Result Value Ref Range    Cholesterol 162 <200 mg/dL    Triglycerides 187 (H) <150 mg/dL    HDL Cholesterol 71 >49 mg/dL    LDL Cholesterol Calculated 53 <100 mg/dL    Non HDL Cholesterol 91 <130 mg/dL   Comprehensive metabolic panel    Collection Time: 06/23/17  8:31 AM   Result Value Ref Range    Sodium 136 133 - 144 mmol/L    Potassium 3.9 3.4 - 5.3 mmol/L    Chloride 106 94 - 109 mmol/L    Carbon Dioxide 20 20 - 32 mmol/L    Anion Gap 10 3 - 14 mmol/L    Glucose 98 70 - 99 mg/dL    Urea Nitrogen 13 7 - 30 mg/dL    Creatinine 0.54 0.52 - 1.04 mg/dL    GFR Estimate >90  Non  GFR Calc   >60 mL/min/1.7m2    GFR Estimate If Black >90   GFR Calc   >60 mL/min/1.7m2    Calcium 8.3 (L) 8.5 -  10.1 mg/dL    Bilirubin Total 0.5 0.2 - 1.3 mg/dL    Albumin 3.2 (L) 3.4 - 5.0 g/dL    Protein Total 7.4 6.8 - 8.8 g/dL    Alkaline Phosphatase 68 40 - 150 U/L    ALT 18 0 - 50 U/L    AST 16 0 - 45 U/L   HCG quantitative pregnancy    Collection Time: 06/23/17  8:31 AM   Result Value Ref Range    HCG Quantitative Serum <1 0 - 5 IU/L   Phosphorus    Collection Time: 06/23/17  8:31 AM   Result Value Ref Range    Phosphorus 3.0 2.5 - 4.5 mg/dL   Hemoglobin A1c    Collection Time: 06/23/17  8:31 AM   Result Value Ref Range    Hemoglobin A1C 5.6 4.3 - 6.0 %   INR    Collection Time: 06/23/17  8:31 AM   Result Value Ref Range    INR 1.01 0.86 - 1.14   Partial thromboplastin time    Collection Time: 06/23/17  8:31 AM   Result Value Ref Range    PTT 26 22 - 37 sec   CBC with platelets    Collection Time: 06/23/17  8:31 AM   Result Value Ref Range    WBC 8.1 4.0 - 11.0 10e9/L    RBC Count 4.43 3.8 - 5.2 10e12/L    Hemoglobin 12.4 11.7 - 15.7 g/dL    Hematocrit 38.8 35.0 - 47.0 %    MCV 88 78 - 100 fl    MCH 28.0 26.5 - 33.0 pg    MCHC 32.0 31.5 - 36.5 g/dL    RDW 13.0 10.0 - 15.0 %    Platelet Count 256 150 - 450 10e9/L   Uric acid    Collection Time: 06/23/17  8:31 AM   Result Value Ref Range    Uric Acid 5.3 2.6 - 6.0 mg/dL   EBV Capsid Antibody IgM    Collection Time: 06/23/17  8:31 AM   Result Value Ref Range    EBV Capsid Antibody IgM  0.0 - 0.8 AI     <0.2  No detectable antibody.   Antibody index (AI) values reflect qualitative changes in antibody   concentration that cannot be directly associated with clinical condition or   disease state.     Routine UA with microscopic    Collection Time: 06/23/17  8:33 AM   Result Value Ref Range    Color Urine Yellow     Appearance Urine Slightly Cloudy     Glucose Urine Negative NEG mg/dL    Bilirubin Urine Negative NEG    Ketones Urine Negative NEG mg/dL    Specific Gravity Urine 1.026 1.003 - 1.035    Blood Urine Negative NEG    pH Urine 5.0 5.0 - 7.0 pH    Protein Albumin  Urine Negative NEG mg/dL    Urobilinogen mg/dL 0.0 0.0 - 2.0 mg/dL    Nitrite Urine Negative NEG    Leukocyte Esterase Urine Small (A) NEG    Source Midstream Urine     WBC Urine 6 (H) 0 - 2 /HPF    RBC Urine 3 (H) 0 - 2 /HPF    Bacteria Urine Few (A) NEG /HPF    Squamous Epithelial /HPF Urine 2 (H) 0 - 1 /HPF    Mucous Urine Present (A) NEG /LPF   Microalbumin quantitative random urine    Collection Time: 06/23/17  8:33 AM   Result Value Ref Range    Creatinine Urine 249 mg/dL    Albumin Urine mg/L 12 mg/L    Albumin Urine mg/g Cr 4.90 0 - 25 mg/g Cr   Protein  random urine    Collection Time: 06/23/17  8:33 AM   Result Value Ref Range    Protein Random Urine 0.12 g/L    Protein Total Urine g/gr Creatinine 0.05 0 - 0.2 g/g Cr   EKG 12-lead complete w/read - Clinics    Collection Time: 06/23/17  8:41 AM   Result Value Ref Range    Interpretation ECG Click View Image link to view waveform and result          Regina Morrison MD

## 2017-06-23 NOTE — LETTER
6/23/2017       RE: Roberta Penn  [address] [facility] [address]     Dear Colleague,    Thank you for referring your patient, Roberta Penn, to the [facility] SOLID ORGAN TRANSPLANT at [facility]. Please see a copy of my visit note below.    Patient attended all appointments and completed all scheduled tests.  Patient to follow up with Transplant Coordinator.  Patient stated understanding and has contact numbers.    +4hour Iohexol was drawn at 1335. Was due at 1320. Unable to obtain blood from IV site. Lab called and blood draw done from this patients hand.     Again, thank you for allowing me to participate in the care of your patient.      Sincerely,    Transplant Evaluation Resource

## 2017-06-23 NOTE — MR AVS SNAPSHOT
After Visit Summary   6/23/2017    Roberta Penn    MRN: [id number]           Patient Information     Date Of Birth          1990        Visit Information        Provider Department      6/23/2017 10:00 AM Shauna Lopez, PAT [facility] Solid Organ Transplant        Today's Diagnoses     Transplant donor evaluation           Follow-ups after your visit        Who to contact     If you have questions or need follow up information about today's clinic visit or your schedule please contact [facility] SOLID ORGAN TRANSPLANT directly at [phone].  Normal or non-critical lab and imaging results will be communicated to you by [employer], letter or phone within 4 business days after the clinic has received the results. If you do not hear from us within 7 days, please contact the clinic through [employer] or phone. If you have a critical or abnormal lab result, we will notify you by phone as soon as possible.  Submit refill requests through [employer] or call your pharmacy and they will forward the refill request to us. Please allow 3 business days for your refill to be completed.          Additional Information About Your Visit        MyChart Information     [employer] gives you secure access to your electronic health record. If you see a primary care provider, you can also send messages to your care team and make appointments. If you have questions, please call your primary care clinic.  If you do not have a primary care provider, please call [phone] and they will assist you.        Care EveryWhere ID     This is your Care EveryWhere ID. This could be used by other organizations to access your [facility] medical records  [id number]         Blood Pressure from Last 3 Encounters:   06/23/17 120/78   06/23/17 120/84   06/23/17 122/72    Weight from Last 3 Encounters:   06/23/17 88.4 kg (194 lb 14.4 oz)              We Performed the Following      REFERRAL        Primary Care Provider    Provider  Unknown       No address on file        Equal Access to Services     [facility] AYLA : Hadii cori salomon etta Vicente, wagabo marioamy, qayanni kajoelelpidio cedeno, elizabeth lepeshakiraspenser stern. So [facility] [phone].    ATENCIÓN: Si habla español, tiene a zhao disposición servicios gratuitos de asistencia lingüística. Llame al [phone].    We comply with applicable federal civil rights laws and [place] laws. We do not discriminate on the basis of race, color, national origin, age, disability sex, sexual orientation or gender identity.            Thank you!     Thank you for choosing [facility] SOLID ORGAN TRANSPLANT  for your care. Our goal is always to provide you with excellent care. Hearing back from our patients is one way we can continue to improve our services. Please take a few minutes to complete the written survey that you may receive in the mail after your visit with us. Thank you!             Your Updated Medication List - Protect others around you: Learn how to safely use, store and throw away your medicines at www.disposemymeds.org.          This list is accurate as of: 6/23/17 11:59 PM.  Always use your most recent med list.                   Brand Name Dispense Instructions for use Diagnosis    drospirenone-ethinyl estradiol 3-0.02 MG per tablet    SEAMUS     Take 1 tablet by mouth daily

## 2017-06-23 NOTE — LETTER
Date:June 27, 2017      Patient was self referred, no letter generated. Do not send.        [facility] Health Information

## 2017-06-23 NOTE — PROGRESS NOTES
Assessment and Plan: 25 yo F who would like to be a nondirected donor.  Takes OCPs for PCOS but no other medications.  Good BP and normal renal function.  Overall a good candidate for donation pending angio results. Issues to address  1.  Obesity.  She has had significant weight gain in one year. Recommend sustained weight loss 15-20 pounds for 6 months prior to donation    Discussed the risks of donating a kidney, including the surgical risk and the possible risks of living with one kidney. Discussed increased risk of preeclampsia.  Pt does not want to become pregnant but says she will consider this.    Education about expected post-donation kidney function and how chronic kidney disease (CKD) and end stage kidney disease (ESKD) might potentially impact the donor in the future, include, but not limited to:       - On average, donors will have 25-35% permanent loss of kidney function at donation.       - Baseline risk of ESKD may slightly exceed that of members of the general population with the same demographic profile.       - Donor risks must be interpreted in light of known epidemiology of both CKD or ESKD, such as that CKD generally develops in midlife (40-50 years old) and ESKD generally develops after age 60.       - Medical evaluation of young potential donors cannot predict lifetime risk of CKD or ESKD.       - Donors may be at higher risk for CKD if they sustain damage to the remaining kidney.       - Development of CKD and progression of ESKD may be more rapid with only 1 kidney.       - Some type of kidney replacement therapy, either kidney transplant or dialysis, is required when reaching ESKD.    Potential medical or surgical risks include, but not limited to:       - Death.       - Scars, pain, fatigue, and other consequences typical of any surgical procedure.       - Decreased kidney function.       - Abdominal or bowel symptoms, such as bloating and nausea, and developing bowel obstruction.        - Kidney failure (ESKD) and the need for a kidney transplant or dialysis for the donor.       - Impact of obesity, hypertension, or other donor-specific medical conditions on morbidity and mortality of the potential donor.    Patients overall evaluation will be discussed with the transplant team and a final recommendation on the patients' suitability to be a kidney transplant donor will be made at that time.    Consult:  Roberta Penn was seen in consultation at the request of Dr. Marrero for evaluation as a potential kidney transplant donor.    Reason for Visit:  Roberta Penn is a 26 year old female who presents for a kidney donor evaluation.  Patient would like to donate to nondirected donor.    HPI:    Patient is a healthy 25 yo F who learned about donation on line.  She takes no medications. Her only health problem is recent weight gain of 50 pounds in past year.  She used to be a distance runner but has stopped exercising because she is busy at work.         Kidney Disease Hx:       h/o Kidney Problems: No  Family h/o Genetic Kidney Disease: No       h/o HTN: No    Usual BP: 115/70     h/o Protein in Urine: No  h/o Blood in Urine: No       h/o Kidney Stones: No  h/o Kidney Injury: No       h/o Recurrent UTI: No  h/o Genitourinary Problems: No       h/o Chronic NSAID Use: No         Other Medical Hx:       h/o DM: No   h/o Gestational DM: No       h/o Preeclampsia: No          h/o Gastrointestinal, Pancreas or Liver Problems: No       h/o Lung or Heart Problems: No       h/o Hematologic Problems: No  h/o Bleeding or Clotting Problems: No       h/o Cancer: No       h/o Infection Problems: No         Skin Cancer Risk:       h/o more than 50 moles: No       h/o extensive sun exposure: No       h/o melanoma: No       Family h/o melanoma: No         Mental Health Assessment:       h/o Depression: No       h/o Psychiatric Illness: No       h/o Suicidal Attempt(s): No    ROS:   A comprehensive review of systems was  obtained and negative, except as noted in the HPI or PMH.    PMH:   History was taken from the patient as noted below.  Past Medical History:   Diagnosis Date     Bursitis 2009     Obesity (BMI 30.0-34.9) 2017    weight gain 50 lb in past one year     Polycystic ovary syndrome 2012    treated with OCPs       PSH:   Past Surgical History:   Procedure Laterality Date     HC TOOTH EXTRACTION W/FORCEP  2002    [place] teeth     Personal or family history of anesthesia problems: No    Family Hx:   Family History   Problem Relation Age of Onset     Pancreatic Cancer Maternal Grandmother      Breast Cancer Maternal Grandmother      Colon Cancer Maternal Grandmother      Hypertension Maternal Grandfather      Obesity Maternal Grandfather      Hypertension Paternal Grandmother           Specific Family Hx:       FH of DM: No       FH of CAD: Yes   FH of HTN: Yes        FH of Cancer: No  FH of Kidney Cancer: No    Personal Hx:   Social History     Social History     Marital status: Single     Spouse name: N/A     Number of children: N/A     Years of education: N/A     Occupational History     Not on file.     Social History Main Topics     Smoking status: Never Smoker     Smokeless tobacco: Not on file     Alcohol use 0.6 oz/week     1 Standard drinks or equivalent per week     Drug use: No     Sexual activity: Not on file     Other Topics Concern     Not on file     Social History Narrative          Specific Social Hx:       Health Insurance Status: Yes       Employment Status: Full time  Occupation: Employed at [employer]                       Living Arrangements:don't know       Social Support: Yes       Presence of increased risk for disease transmission behaviors as defined by PHS guidelines: No        Allergies:  No Known Allergies    Medications:  Prior to Admission medications    Medication Sig Start Date End Date Taking? Authorizing Provider   drospirenone-ethinyl estradiol (SEAMUS) 3-0.02 MG per tablet Take 1  "tablet by mouth daily    Reported, Patient       Vitals:  Vital Signs 6/23/2017 6/23/2017 6/23/2017   Systolic 122 120 120   Diastolic 72 78 84   Pulse 100 - -   Temperature 98.6 - -   Respirations 20 - -   Weight (LB) 194 lb 14.4 oz - -   Height 5' 7\" - -   BMI (Calculated) 30.59 - -   Pain - - -   O2 97 - -       Exam:   GENERAL APPEARANCE: alert and no distress  HENT: mouth without ulcers or lesions  LYMPHATICS: no cervical or supraclavicular nodes  RESP: lungs clear to auscultation - no rales, rhonchi or wheezes  CV: regular rhythm, normal rate, no rub, no murmur  EDEMA: no LE edema bilaterally  ABDOMEN: soft, nondistended, nontender, bowel sounds normal. No bruits over kidneys  MS: extremities normal - no gross deformities noted, no evidence of inflammation in joints, no muscle tenderness  SKIN: no rash    Results:   Labs and imaging were ordered for this visit and reviewed by me.  Recent Results (from the past 336 hour(s))   ABO/Rh type and screen    Collection Time: 06/23/17  8:31 AM   Result Value Ref Range    ABO AB     RH(D)  Neg     Antibody Screen Neg     Test Valid Only At       [facility]    Specimen Expires 06/26/2017    CMV Antibody IgG    Collection Time: 06/23/17  8:31 AM   Result Value Ref Range    CMV Antibody IgG  0.0 - 0.8 AI     <0.2  Negative   Antibody index (AI) values reflect qualitative changes in antibody   concentration that cannot be directly associated with clinical condition or   disease state.     EBV Capsid Antibody IgG    Collection Time: 06/23/17  8:31 AM   Result Value Ref Range    EBV Capsid Antibody IgG (H) 0.0 - 0.8 AI     >8.0  Positive, suggests recent or past exposure   Antibody index (AI) values reflect qualitative changes in antibody   concentration that cannot be directly associated with clinical condition or   disease state.     Hepatitis B core antibody    Collection Time: 06/23/17  8:31 AM   Result Value Ref Range    " Hepatitis B Core Caty Nonreactive NR   Hepatitis B Surface Antibody    Collection Time: 06/23/17  8:31 AM   Result Value Ref Range    Hepatitis B Surface Antibody 33.14 (H) <8.00 m[IU]/mL   Hepatitis B surface antigen    Collection Time: 06/23/17  8:31 AM   Result Value Ref Range    Hep B Surface Agn Nonreactive NR   Hepatitis C antibody    Collection Time: 06/23/17  8:31 AM   Result Value Ref Range    Hepatitis C Antibody  NR     Nonreactive   Assay performance characteristics have not been established for newborns,   infants, and children     HIV Antigen Antibody Combo Pretransplant    Collection Time: 06/23/17  8:31 AM   Result Value Ref Range    HIV Antigen Antibody Combo Pretransplant  NR     Nonreactive   HIV-1 p24 Ag & HIV-1/HIV-2 Ab Not Detected     Anti Treponema    Collection Time: 06/23/17  8:31 AM   Result Value Ref Range    Treponema pallidum Antibody Negative NEG   Lipid Profile    Collection Time: 06/23/17  8:31 AM   Result Value Ref Range    Cholesterol 162 <200 mg/dL    Triglycerides 187 (H) <150 mg/dL    HDL Cholesterol 71 >49 mg/dL    LDL Cholesterol Calculated 53 <100 mg/dL    Non HDL Cholesterol 91 <130 mg/dL   Comprehensive metabolic panel    Collection Time: 06/23/17  8:31 AM   Result Value Ref Range    Sodium 136 133 - 144 mmol/L    Potassium 3.9 3.4 - 5.3 mmol/L    Chloride 106 94 - 109 mmol/L    Carbon Dioxide 20 20 - 32 mmol/L    Anion Gap 10 3 - 14 mmol/L    Glucose 98 70 - 99 mg/dL    Urea Nitrogen 13 7 - 30 mg/dL    Creatinine 0.54 0.52 - 1.04 mg/dL    GFR Estimate >90  Non  GFR Calc   >60 mL/min/1.7m2    GFR Estimate If Black >90   GFR Calc   >60 mL/min/1.7m2    Calcium 8.3 (L) 8.5 - 10.1 mg/dL    Bilirubin Total 0.5 0.2 - 1.3 mg/dL    Albumin 3.2 (L) 3.4 - 5.0 g/dL    Protein Total 7.4 6.8 - 8.8 g/dL    Alkaline Phosphatase 68 40 - 150 U/L    ALT 18 0 - 50 U/L    AST 16 0 - 45 U/L   HCG quantitative pregnancy    Collection Time: 06/23/17  8:31 AM    Result Value Ref Range    HCG Quantitative Serum <1 0 - 5 IU/L   Phosphorus    Collection Time: 06/23/17  8:31 AM   Result Value Ref Range    Phosphorus 3.0 2.5 - 4.5 mg/dL   Hemoglobin A1c    Collection Time: 06/23/17  8:31 AM   Result Value Ref Range    Hemoglobin A1C 5.6 4.3 - 6.0 %   INR    Collection Time: 06/23/17  8:31 AM   Result Value Ref Range    INR 1.01 0.86 - 1.14   Partial thromboplastin time    Collection Time: 06/23/17  8:31 AM   Result Value Ref Range    PTT 26 22 - 37 sec   CBC with platelets    Collection Time: 06/23/17  8:31 AM   Result Value Ref Range    WBC 8.1 4.0 - 11.0 10e9/L    RBC Count 4.43 3.8 - 5.2 10e12/L    Hemoglobin 12.4 11.7 - 15.7 g/dL    Hematocrit 38.8 35.0 - 47.0 %    MCV 88 78 - 100 fl    MCH 28.0 26.5 - 33.0 pg    MCHC 32.0 31.5 - 36.5 g/dL    RDW 13.0 10.0 - 15.0 %    Platelet Count 256 150 - 450 10e9/L   Uric acid    Collection Time: 06/23/17  8:31 AM   Result Value Ref Range    Uric Acid 5.3 2.6 - 6.0 mg/dL   EBV Capsid Antibody IgM    Collection Time: 06/23/17  8:31 AM   Result Value Ref Range    EBV Capsid Antibody IgM  0.0 - 0.8 AI     <0.2  No detectable antibody.   Antibody index (AI) values reflect qualitative changes in antibody   concentration that cannot be directly associated with clinical condition or   disease state.     Routine UA with microscopic    Collection Time: 06/23/17  8:33 AM   Result Value Ref Range    Color Urine Yellow     Appearance Urine Slightly Cloudy     Glucose Urine Negative NEG mg/dL    Bilirubin Urine Negative NEG    Ketones Urine Negative NEG mg/dL    Specific Gravity Urine 1.026 1.003 - 1.035    Blood Urine Negative NEG    pH Urine 5.0 5.0 - 7.0 pH    Protein Albumin Urine Negative NEG mg/dL    Urobilinogen mg/dL 0.0 0.0 - 2.0 mg/dL    Nitrite Urine Negative NEG    Leukocyte Esterase Urine Small (A) NEG    Source Midstream Urine     WBC Urine 6 (H) 0 - 2 /HPF    RBC Urine 3 (H) 0 - 2 /HPF    Bacteria Urine Few (A) NEG /HPF    Squamous  Epithelial /HPF Urine 2 (H) 0 - 1 /HPF    Mucous Urine Present (A) NEG /LPF   Microalbumin quantitative random urine    Collection Time: 06/23/17  8:33 AM   Result Value Ref Range    Creatinine Urine 249 mg/dL    Albumin Urine mg/L 12 mg/L    Albumin Urine mg/g Cr 4.90 0 - 25 mg/g Cr   Protein  random urine    Collection Time: 06/23/17  8:33 AM   Result Value Ref Range    Protein Random Urine 0.12 g/L    Protein Total Urine g/gr Creatinine 0.05 0 - 0.2 g/g Cr   EKG 12-lead complete w/read - Clinics    Collection Time: 06/23/17  8:41 AM   Result Value Ref Range    Interpretation ECG Click View Image link to view waveform and result          Regina Morrison MD

## 2017-06-23 NOTE — LETTER
Date:June 27, 2017      Patient was self referred, no letter generated. Do not send.        [facility] Health Information

## 2017-06-23 NOTE — MR AVS SNAPSHOT
After Visit Summary   6/23/2017    Roberta Penn    MRN: [id number]           Patient Information     Date Of Birth          1990        Visit Information        Provider Department      6/23/2017 12:00 PM Sapna Emery RD [facility] Solid Organ Transplant        Today's Diagnoses     Transplant donor evaluation           Follow-ups after your visit        Your next 10 appointments already scheduled     Jun 23, 2017  1:00 PM CDT   (Arrive by 12:30 PM)   Kidney Donor Evaluation with Regina Morrison MD   [facility] Nephrology ([facility])    [address]  3rd Floor  [place] [id number] [phone]            Jun 23, 2017  2:15 PM CDT   (Arrive by 2:00 PM)   XR CHEST 2 VIEWS with [email or web address]   [facility] Xray ([facility])    [address] [place] [id number] [phone]           Please bring a list of your current medicines to your exam. (Include vitamins, minerals and over-thecounter medicines.) Leave your valuables at home.  Tell your doctor if there is a chance you may be pregnant.  You do not need to do anything special for this exam.            Jun 23, 2017  2:40 PM CDT   (Arrive by 2:25 PM)   CT RENAL ANGIO with UCCT1   [facility] CT ([facility])    [address] [id number] [phone]           Please bring any scans or X-rays taken at other hospitals, if similar tests were done. Also bring a list of your medicines, including vitamins, minerals and over-the-counter drugs. It is safest to leave personal items at home.  Be sure to tell your doctor:   If you have any allergies.   If there s any chance you are pregnant.   If you are breastfeeding.   If you have any special needs.  You will have contrast for this exam. To prepare:   Do not eat or drink for 2 hours before your exam. If you need to take medicine, you may  take it with small sips of water. (We may ask you to take liquid medicine as well.)   The day before your exam, drink extra fluids at least six 8-ounce glasses (unless your doctor tells you to restrict your fluids).  Patients over 70 or patients with diabetes or kidney problems:   If you haven t had a blood test (creatinine test) within the last 30 days, go to your clinic or Diagnostic Imaging Department for this test.  If you have diabetes:   If your kidney function is normal, continue taking your metformin (Avandamet, Glucophage, Glucovance, Metaglip) on the day of your exam.   If your kidney function is abnormal, wait 48 hours before restarting this medicine.  Please wear loose clothing, such as a sweat suit or jogging clothes. Avoid snaps, zippers and other metal. We may ask you to undress and put on a hospital gown.  If you have any questions, please call the Imaging Department where you will have your exam.              Who to contact     If you have questions or need follow up information about today's clinic visit or your schedule please contact [facility] SOLID ORGAN TRANSPLANT directly at [phone].  Normal or non-critical lab and imaging results will be communicated to you by [employer], letter or phone within 4 business days after the clinic has received the results. If you do not hear from us within 7 days, please contact the clinic through [employer] or phone. If you have a critical or abnormal lab result, we will notify you by phone as soon as possible.  Submit refill requests through [employer] or call your pharmacy and they will forward the refill request to us. Please allow 3 business days for your refill to be completed.          Additional Information About Your Visit        [employer] Information     [employer] gives you secure access to your electronic health record. If you see a primary care provider, you can also send messages to your care team and make appointments. If you have questions, please call your  primary care clinic.  If you do not have a primary care provider, please call [phone] and they will assist you.        Care EveryWhere ID     This is your Care EveryWhere ID. This could be used by other organizations to access your [place] medical records  [id number]         Blood Pressure from Last 3 Encounters:   06/23/17 120/84   06/23/17 122/72    Weight from Last 3 Encounters:   06/23/17 88.4 kg (194 lb 14.4 oz)              We Performed the Following     NUTRITION REFERRAL        Primary Care Provider    Provider Unknown       No address on file        Equal Access to Services     [facility]: Hadii cori Vicente, waaxda luqadaha, qaybshannon kaalmaelpidio cedeno, elizabeth herron . So [facility] [phone].    ATENCIÓN: Si habla español, tiene a zhao disposición servicios gratuitos de asistencia lingüística. Llame al [phone].    We comply with applicable federal civil rights laws and [place] laws. We do not discriminate on the basis of race, color, national origin, age, disability sex, sexual orientation or gender identity.            Thank you!     Thank you for choosing [facility]GAN TRANSPL[facility]  for your care. Our goal is always to provide you with excellent care. Hearing back from our patients is one way we can continue to improve our services. Please take a few minutes to complete the written survey that you may receive in the mail after your visit with us. Thank you!             Your Updated Medication List - Protect others around you: Learn how to safely use, store and throw away your medicines at www.disposemymeds.org.          This list is accurate as of: 6/23/17 12:44 PM.  Always use your most recent med list.                   Brand Name Dispense Instructions for use Diagnosis    drospirenone-ethinyl estradiol 3-0.02 MG per tablet    SEAMUS     Take 1 tablet by mouth daily

## 2017-06-23 NOTE — PATIENT INSTRUCTIONS
Dear Roberta Penn    Thank you for choosing [facility] ([facility]) for your kidney evaluation.  The following information is a summary of our appointment as well as important reminders.      Additional information: Your placement of peripheral IV and iohexol (omnipaque) injection.      We look forward in seeing you on your next appointment here at [facility].  Please don t hesitate to call us at [phone] to reschedule any of your appointments or to speak with one of the [facility] registered nurses.  It was a pleasure taking care of you today.    Sincerely,  Prema Franco, RN  [facility]  [address] [place] [address]  [id number]  Phone:  [phone]    Patient Education    Iohexol (oral/rectal administration)    Iohexol injection (intrathecal)    Iohexol injection (Intra-uterine administration)    Iohexol injection (intravascular)  Iohexol injection (intravascular)  What is Iohexol injection (intravascular)?  IOHEXOL (Omnipaque ) is a radiopaque agent used to diagnose certain medical conditions. Iohexol will be given into your vein or artery by a health care provider. Sometimes, iohexol is injected into the knee or shoulder to see if there is anything wrong. Iohexol can also be used to look at your pancreas, bladder, to identify gallstones, to identify blood clots, or to see if you have a hernia. It is usually only given in a hospital or clinic. Iohexol contains iodine. The iodine in iohexol will make the blood vessels and structures in your body opaque or white so they can be photographed by x-rays or CT scans. Usually several pictures are taken as iohexol moves through your body. Iohexol can be used to take pictures of the blood vessels around your heart, your brain, your kidney or other structures in your body. Generic iohexol injections are not yet available.  What should my health care  professional know before I receive Iohexol?  They need to know if you have any of these conditions:    asthma    allergic tendencies including eczema, hayfever, or allergies to food or drugs    blood clots or strokes    dehydration or if you are taking diuretics such as furosemide (Lasix ) or bumetanide (Bumex )    diabetes mellitus    heart disease    heart failure    high blood pressure or pheochromocytoma    liver disease    lung disease    multiple myeloma    myasthenia gravis    kidney disease or decreased kidney function    seizures    sickle cell disease    thyroid disease    an unusual reaction to Iohexol, iodine, medicines, foods, dyes, or preservatives    pregnant or trying to get pregnant    breast-feeding  How should this medicine be used?  Iohexol is for injection or infusion into a vein, artery, joint, or other body part. It is given by a health-care provider in a hospital or clinic setting. Your health care provider may have special instructions for you before you have this procedure. Follow these directions carefully.  What if I miss a dose?  This does not apply.  What drug(s) may interact with Iohexol?    aldesleukin-2 (IL-2)    antiinflammatory drugs (NSAIDs, such as ibuprofen)    amiodarone    amphotericin B    certain antibiotics given by injection    certain medicines used to control high blood pressure    cisplatin    cyclosporine    entecavir    glipizide; metformin    glyburide; metformin    metformin    metformin; rosiglitazone    water pills  You may or may not be able to take your regular medications during the time of your procedure. Ask your health care provider.  Tell your prescriber or health care professional about all other medicines you are taking, including non-prescription medicines, nutritional supplements, or herbal products. Also tell your prescriber or health care professional if you are a frequent user of drinks with caffeine or alcohol, if you smoke, or if you use illegal  drugs. These may affect the way your medicine works. Check with your health care professional before stopping or starting any of your medicines.  What should I watch for while taking Iohexol?  Follow all instructions of your health care provider to properly prepare you for your test. Serious side effects are rare. After the test, drink plenty of water to avoid dehydration.  Follow all instructions of your prescriber for care after the test.  What side effects may I notice from receiving Iohexol?  Side effects that you should report to your prescriber or health care professional as soon as possible:    an unusual feeling of pain or warmth    change in vision    chest pain    chills or fever    decrease or increase in the amount of urine    dizziness    excessive sweating or intolerance to heat    fast or irregular heart beat or pulse    hives    hot flashes    itching    nausea or vomiting    nervousness    pain, swelling, or warmth where iohexol was injected    rash    seizures    swelling of your lips or face    tightness in chest or troubled breathing    wheezing  Side effects that usually do not require medical attention (report to your prescriber or health care professional if they continue or are bothersome):    anxiety    bitter or bad taste in mouth    bruising    headache    nose congestion    pain or tingling in your hands or feet  Where can I keep my medicine?  This does not apply. You will only receive iohexol in a hospital or clinic setting.  NOTE:This sheet is a summary. It may not cover all possible information. If you have questions about this medicine, talk to your doctor, pharmacist, or health care provider. Copyright  2016 Gold Standard

## 2017-06-23 NOTE — PROGRESS NOTES
"PSYCHOLOGICAL EVALUATION      RELEVANT HISTORY AND REASON FOR REFERRAL:  Roberta Penn is a 26-year-old single, white woman interested in anonymously donating a kidney to anyone in need.  This psychological evaluation is requested by Dr. Melecio Marrero on behalf of the organ donor team, to assess mental health as it pertains to her suitability for organ donation.      EVALUATION FINDINGS:  Ms. Penn arrived early and alone, presenting as a tall, heavyset young woman with very long straight blonde hair, casually dressed and well-groomed.  Mood was euthymic throughout.  Questions were answered with jose.      In general, she tries to help others through good deeds when possible.  She has done some lachelle work and donates money to worthy causes. She hasn't known anyone who needed transplant, but has always been a supporter of it, signing up for  organ donation when she got her 's license.  A few months ago, she read a human interest story about an organ donation case, which got her seriously thinking about it.  First she researched it on the Internet and then contacted our center, reviewing the educational materials sent by our Transplant Center.  She also spoke with her family (mother and several siblings) about it.  They all \"thought I was crazy\" but nevertheless were supportive.       She understands the constraints of anonymous donation and is fine with it going to anyone in need.  It would be her preference to eventually meet the recipient and learn how the donation worked out, but she would accept it if that is not to the recipient's liking.      She also understands the surgical risks include death and complications that could prolong recovery or cause permanent health problems.  She knows there is a chance she might need open (nonlaparoscopic) surgery, which could prolong recovery.  She does not really see a downside to going through the rest of her life with a single kidney.  There is no known " "family history of renal disease, so she doesn't expect to need to donate it to a loved one.  As for the risks of losing the remaining organ to trauma or disease, she replied, \"I try not to think about the what ifs.  What's meant to be will be.\"  Basically, she tries not to allow fears to influence her decisions.      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  She discussed her plans with her supervisor and an , who assured her it would be fine for her to take time off.  She has a lot of accrued paid time off and also could get family medical leave.  It is unclear whether she also has short-term disability benefits.  Her mother would care for her during the recovery.  She points out that her work is not very physically demanding.      For the most part, she has enjoyed good health.  There have been bouts of bursitis in the past and she is on birth control pills for ovarian cysts.  She does not take any other prescription medications.  The only surgery was wisdom tooth extraction.  Psychiatric problems of any kind were denied.  She never sought or felt the need for mental health treatment of any sort.      She has never been a tobacco user.  Marijuana was tried once.  Other drug use is denied.  Alcohol use was immoderate at times during her college years, but is infrequent now.      She grew up in the [facility], reared by her parents.  They  when she was college age.  Her father is in agricultural ; her mother an .  Ms. Penn is the third of their four children.  She did well in school, finishing high school and attending college in several places as well as studying abroad, before ultimately earning a Bachelor's degree in communication and journalism from the [employer], [place].  She has been with her current company for five years, in her current position for the last year.  She is single with no children, living with two " roommates in [place] [facility].      The MMPI-2-RF, a self-report personality measure, was completed.  All items were answered.  Validity indicators suggest candid, consistent responding, so the results are viewed as an accurate reflection of current emotional functioning and true personality dynamics.  The profiles reflect  slightly unusual perceptions or thought processes (such as a belief that it may be possible to know what others are thinking and vice versa), but overall the findings are not concerning for a psychotic disorder, or for that matter any other form of mental illness.  This appears to be a socially conforming individual, not prone to behavioral acting out.      CONCLUSIONS AND RECOMMENDATIONS:  This kind 26-year-old woman is interested in donating a kidney to anyone in need.  She tries to help charities and donate money to good causes whenever possible.  She began thinking more seriously about kidney donation after reading a human interest story about it a few months ago.  Since then she educated herself about the procedure and the risks involved.  She should have no difficulty getting time off with pay from her full-time job and would stay with her mother during her recuperation.  There is no history of mental illness.  The MMPI-2-RF reflects mildly unconventional thinking, but does not reach a level concerning for a formal thought disorder/psychosis and is otherwise within normal limits.  I see no indications of ulterior motives.  She appears to be of normal intelligence and can make well-informed decisions about transplant and tolerate the stress of the procedure.  In my view, there are no psychosocial contraindications to organ donation.      Minnie Russell PsyD   Licensed Psychologist   Board Certified in Clinical Neuropsychology/ABPP        DIAGNOSTIC IMPRESSION:  Screening for mental illness/depression and kidney donor workup.  This evaluation included a 1-hour diagnostic interview (CPT code 40456 and  1 hour of personality assessment testing using the MMPI-2-RF with interpretation (CPT code 15208).         BRADEN ARSHAD             D: 2017 12:04   T: 2017 13:17   MT: nh      Name:     SANDRA CRANE   MRN:      [id number]        Account:      [id number]   :      1990           Service Date: 2017      Document: [id number]

## 2017-06-23 NOTE — NURSING NOTE
Saw Roberta Penn in clinic during kidney donor evaluation.  Has offered to be donor to Non- Direct Donor program.    Discussed surgery hospitalization and recovery.  Knows when and how to obtain evaluation results and the process for scheduling surgery.  Has received and reviewed the donor education materials including donor DVD.  Signed consent for donor evaluation.  Reviewed SRTR Datasheet.   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  She is single with no children.  She has a history of wisdom teeth extraction.  Requested routine cancer screening tests:     1.Pap.       Time spent 20 minutes.    I reviewed the possible ways that she can give a kidney.  One being running a matchrun after setting a date and her kidney would be allocated to the person at the top of the list.  I also reviewed that details pertaining to the Paired Exchange programs.                              1.National Kidney Registry                          2.Internal Exchange  I discussed with patient the matching procedure and logistics of each program.  Patient has signed consent for:                          1.National Kidney Registry                          2.Internal Exchange Program                          3. Shipping of donor kidney              4. Non-Direct Donor Program                            Patient signed OKSANA form for purposes of sharing evaluation and listing data for the Paired Exchange Program.  Questions answered.    I reviewed that in KPD you are not able to choose your match, but they may decline a match.  I showed her possible scenarios that can happen when a chain is put together where there is a possibility of helping more than one candidate receive a kidney.     I reviewed that the timeline for listing and finding a match is unknown, there may be significant waiting time to find a matched based on blood type.     I described the timing of a typical KPD event, where the donor cases go early in the morning and then the  kidneys are shipped to the recipient site.     That the donor s kidney could be lost in transport, and other potentially negative consequences related to shipping a kidney.   I also reviewed that in [facility] program the insurance of her matched recipient will be paying for hher donation costs.  I also reviewed:              1. The possibility that the matched candidate s insurance might not cover travel costs if the paired donor travels to the matched recipient transplant hospital.              2. The possibility of the paired donor s name appearing on the matched candidate s insurance estimation of benefits.    I reviewed that frequent lab draws are necessary in the D program.  I explained that [facility] will send her a kit when we initiate listing so that her blood can be frozen to be used for exploratory crossmatches.  I stated that there is also additional testing needed when a match offer is given for confirmatory compatibility testing and infectious disease testing.  I reviewed that her evaluation is good for one year.  If we are waiting after one year, then she could have labs done to update her evaluation.    I reviewed that it is okay to send a card to her recipient, that we ask that she leave any identifying information off of the correspondence.  I stated that if all parties are willing, the Transplant office can facilitate meetings between matched donors and recipients.  I reviewed that she has the right to withdraw from participation in the D program at any time, for any reason.   Time spent 20 minutes.    Questions answered.       Living Kidney Donor Consent per OPTN Policy 12.0 for Transplant Coordinators    Written assurance has been obtained from the patient that the donor:   1) Is willing to donate  2) Is free from inducement and coercion  3) Has been informed that the donor may decline to donate at any time  4) Has been informed that transplant centers must:     A) Offer donors an opportunity to  discontinue the donor consent or evaluation process in a way that is protected and confidential    B) Provide an independent donor advocate (LUIS) to assist the potential donor during this process    The following was presented in a language in which donor is able to engage in meaningful dialogue and was reviewed and discussed with the patient by the transplant coordinator and the physician.     The following has been provided:   Education and instruction about all phases of the living donation process includin) Consent  2) The donor must undergo medical and psychosocial evaluations  3) Disclosure that the recovery hospital will take all reasonable precautions to provide confidentiality for the donor/recipient  4) Disclosure that it is a federal crime for any person to knowingly acquire, obtain or otherwise transfer any human organ for valuable consideration  5) The transplant hospital may refuse the potential donor, and the donor could be evaluated by another transplant program with different selection criteria  6) Data from the most recent SRTR center-specific reports:     A) National 1-year patient and graft survival rates    B) Hospital s 1-year patient and graft survival rates    C) Notification about all CMS outcome requirements not being met by the recovery and recipient s hospitals    The following has been provided:   1) Education about expected post-donation kidney function and how chronic kidney disease and end-stage renal disease might potentially impact the donor in the future to include:    A) On average, donors will have 25-35% permanent loss of kidney function at donation    B) Baseline risk of End Stage Renal Disease (ESRD)  does not exceed that of members of general population with same demographic profile    C) Donor risks must be interpreted in light of known epidemiology of both Chronic Kidney Disease (CKD) or ESRD    D) CKD generally develops in midlife (40-50 years old)    E) ESRD  generally develops after age 60    F) Medical evaluation of young potential donor cannot predict lifetime risk  2) Donors may be at higher risk for CKD if they sustain damage to the remaining kidney. 3) Development of CKD and progression to ESRD may be more rapid with only 1 kidney  4) Dialysis is required when reaching ESRD  5) Current practice prioritizes prior living kidney donors who became kidney transplant candidates  6) Alternate procedures or courses of treatment for the recipient, including  donor transplant    A) A  donor kidney may become available for the recipient before donor evaluation is complete or transplant occurs    B) Any transplant candidate may have risk factors for increased morbidity or mortality that are not disclosed to the potential donor  7) The patient will receive a thorough medical and psychosocial evaluation  8) Health information obtained during the evaluation is subject to the same regulations as all records and could reveal conditions that must be reported to local, state, or federal public health authorities  9) The [facility] [place], is required to report living donor follow up information at 6, 12, and 24 months  10) Potential donors must commit to post operative follow up testing coordinated by the [facility]  11) Any infectious disease or malignancy pertinent to acute recipient care discovered during the potential donor s first two years of follow up care:    A) Will be disclosed to the donor    B) May need to be reported to local, state or federal public health authorities    C) Will be disclosed to their recipient s transplant center, and    D) Will be reported through the OPTN Improving Patient Safety Portal    Disclosure has been provided that these risks may be transient or permanent & include but are not limited to potential medical or surgical risks:    Death    Scars, pain, fatigue, and other consequences typical of any surgical  procedure    Decreased kidney function    Abdominal or bowel symptoms such as bloating and nausea, and developing bowel obstruction    Kidney failure and the need for dialysis or kidney transplant for the donor  Impact of obesity, hypertension or other donor-specific medical conditions on morbidity and mortality of the potential donor    Marti Shin RN  Living Donor Coordinator  06/23/2017 3:17 PM

## 2017-06-23 NOTE — MR AVS SNAPSHOT
After Visit Summary   6/23/2017    Roberta Penn    MRN: [id number]           Patient Information     Date Of Birth          1990        Visit Information        Provider Department      6/23/2017 11:00 AM Melecio Marrero MD [facility] Solid Organ Transplant        Today's Diagnoses     Transplant donor evaluation    -  1       Follow-ups after your visit        Your next 10 appointments already scheduled     Jun 23, 2017  1:00 PM CDT   (Arrive by 12:30 PM)   Kidney Donor Evaluation with Regina Morrison MD   [facility]rology ([facility])    [address] [facility] [address]   [phone]            Jun 23, 2017  2:15 PM CDT   (Arrive by 2:00 PM)   XR CHEST 2 VIEWS with UCXR1   [facility] Xray ([facility])    [address] [place] [id number] [phone]           Please bring a list of your current medicines to your exam. (Include vitamins, minerals and over-thecounter medicines.) Leave your valuables at home.  Tell your doctor if there is a chance you may be pregnant.  You do not need to do anything special for this exam.            Jun 23, 2017  2:40 PM CDT   (Arrive by 2:25 PM)   CT RENAL ANGIO with UCCT1   [facility] CT ([facility])    [address] [id number] [phone]           Please bring any scans or X-rays taken at other hospitals, if similar tests were done. Also bring a list of your medicines, including vitamins, minerals and over-the-counter drugs. It is safest to leave personal items at home.  Be sure to tell your doctor:   If you have any allergies.   If there s any chance you are pregnant.   If you are breastfeeding.   If you have any special needs.  You will have contrast for this exam. To prepare:   Do not eat or drink for 2 hours before your exam. If you need to take medicine, you may take  it with small sips of water. (We may ask you to take liquid medicine as well.)   The day before your exam, drink extra fluids at least six 8-ounce glasses (unless your doctor tells you to restrict your fluids).  Patients over 70 or patients with diabetes or kidney problems:   If you haven t had a blood test (creatinine test) within the last 30 days, go to your clinic or Diagnostic Imaging Department for this test.  If you have diabetes:   If your kidney function is normal, continue taking your metformin (Avandamet, Glucophage, Glucovance, Metaglip) on the day of your exam.   If your kidney function is abnormal, wait 48 hours before restarting this medicine.  Please wear loose clothing, such as a sweat suit or jogging clothes. Avoid snaps, zippers and other metal. We may ask you to undress and put on a hospital gown.  If you have any questions, please call the Imaging Department where you will have your exam.              Who to contact     If you have questions or need follow up information about today's clinic visit or your schedule please contact [facility] SOLID ORGAN TRANSPLANT directly at [phone].  Normal or non-critical lab and imaging results will be communicated to you by Social Solutions[employer], letter or phone within 4 business days after the clinic has received the results. If you do not hear from us within 7 days, please contact the clinic through [employer] or phone. If you have a critical or abnormal lab result, we will notify you by phone as soon as possible.  Submit refill requests through [employer] or call your pharmacy and they will forward the refill request to us. Please allow 3 business days for your refill to be completed.          Additional Information About Your Visit        Novavax Information     [employer] gives you secure access to your electronic health record. If you see a primary care provider, you can also send messages to your care team and make appointments. If you have questions, please call your  primary care clinic.  If you do not have a primary care provider, please call [phone] and they will assist you.        Care EveryWhere ID     This is your Care EveryWhere ID. This could be used by other organizations to access your [place] medical records  [id number]         Blood Pressure from Last 3 Encounters:   06/23/17 120/84   06/23/17 122/72    Weight from Last 3 Encounters:   06/23/17 88.4 kg (194 lb 14.4 oz)              Today, you had the following     No orders found for display       Primary Care Provider    Provider Unknown       No address on file        Equal Access to Services     [facility]: Hadii aad umm quiles Sorashmi, waaxda lufarhanadaha, qaybshannon kaalmaelpidio cedeno, elizabeth herron . So [facility] [phone].    ATENCIÓN: Si habla español, tiene a zhao disposición servicios gratuitos de asistencia lingüística. Llame al [phone].    We comply with applicable federal civil rights laws and [place] laws. We do not discriminate on the basis of race, color, national origin, age, disability sex, sexual orientation or gender identity.            Thank you!     Thank you for choosing [facility] SOLID ORGAN TRANSPLANT  for your care. Our goal is always to provide you with excellent care. Hearing back from our patients is one way we can continue to improve our services. Please take a few minutes to complete the written survey that you may receive in the mail after your visit with us. Thank you!             Your Updated Medication List - Protect others around you: Learn how to safely use, store and throw away your medicines at www.disposemymeds.org.          This list is accurate as of: 6/23/17 12:56 PM.  Always use your most recent med list.                   Brand Name Dispense Instructions for use Diagnosis    drospirenone-ethinyl estradiol 3-0.02 MG per tablet    SEAMUS     Take 1 tablet by mouth daily

## 2017-06-23 NOTE — PROGRESS NOTES
Psychosocial Evaluation  Living Organ Donation per OPTN Policy 14.1.A  Organ Type: Non-directed Kidney Donor  Presenting Information:  Jagjit Penn presents to the [facility] to complete a psychosocial evaluation since she is interested in becoming a Non-directed kidney donor.  Roberta is a 26 year old  female.  She has long blond hair and was neatly dressed and groomed.  Mood was euthymic and thought process logical and goal directed.  I met with her alone initially and later had her mother join us.  Both were pleasant, asked a lot of thoughtful questions and were forthcoming in sharing information.  PERSONAL BACKGROUND:  Current Living Situation: Roberta lives in [facility], in a rented duplex, with two roommates.    Education/Employment/Financial Situation: Roberta holds a Lynne's degree in Language Arts from [employer].  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  She has vacation time, sick time and short term disability benefits available to her.  He has talked with her HR department to clarify her benefits already.    Family History: Roberta was reared in [place], [place].  Her parent's  when she was in college.  She has one brother and two sisters.  She reports she gets along well with all family members but apparently some don't get along as well with each other.  She is single.  No significant other.  No children.  She is aware there is increased risk of preeclampsia.    General Health: Roberta considers herself to be generally supportive.    Mental Health: She denies any current or past mental health issues.  She has never had reason to seek out the support of a therapist or psychiatrist.  No known family history of mental illness.    Alcohol and Drug Use/Abuse/Dependency: She drinks alcohol rarely, possibly one drink per week.  No history of chemical abuse issues.  No drug use.  No family history of  chemical dependency.    Cigarette Use: Denies    Legal: Denies    Coping Strategies/Support System: She meche with stress by going for walks, shopping, movies and reading.  She enjoys being outdoors.  She has close relationships with friends and family.  She has indicated a desire to be a  organ donor on her license.    DONOR SPECIFIC INFORMATION:  Decision Process/Motivation to Donate: Roberta is interested in being a Non-directed kidney donor.  She says she has always wanted to do it and now seems to be a good time.  She is finished with college and has a stable job with PTO benefits available to her.  She has a spare kidney that she doesn't need and believes she should use it to give someone else the opportunity to have a better life.  She is aware that she may never meet the recipient nor receive thanks.  Her mother says that her desire to be a donor is admirable and she will support her throughout the process.  She has indicated a desire to be a  organ donor on her 's license.    PREPARATION FOR DONATION, RECOVERY, AND POTENTIAL SHORT-LONG-TERM OUTCOMES:  Understanding of the Living Donation Process:   We discussed the role of the donor  and Independent Donor Advocate.  Short and long term medical and psychosocial risks to both, donor and recipient were reviewed and she is capable of understanding the risks.  High risk behaviors as defined by US Public Health Services (PHS) 2013 that have potential to increase risk of disease transmission were reviewed and she denies high risk behaviors. Post surgical restrictions (2 weeks no driving, 6 weeks no lifting over 10 lbs) were reviewed and she appears capable of adhering to the post surgical requirements. The need for a caregiver was discussed and her mother will care for her while she is recovering from surgery .  The risk of poor psychosocial outcome including problems with body image, post-surgery depression or anxiety, or  feelings of emotional distress or bereavement if recipient experiences any recurrent disease, poor outcome or death was reviewed.  Additionally, potential financial implications, including the risk of having difficulty obtaining health care insurance, life insurance, disability insurance, or long term care insurance were reviewed, as were available donor grants to assist with donor related expenses.      We also discussed some unique issues that arise with paired kidney donation, which include the uncertainty of the timing and the importance of having a employment situation and support system that is able to provide sustained support and flexibility.    Roberta Penn appears capable of understanding this information and making an informed medical decision.    Impressions/Recommendations:   Roberta Penn  is highly motivated to donate to be a Non-directed donor.  Her decision to donate is free of inducement, coercion, or other undue pressure.   Her housing, finances and employment are stable.  No current/active mental health or chemical abuse issues were identified.  The need for a caregiver was reviewed and she is able to identify a plan to meet her post operative care needs.  She appears capable of making an informed medical decision.  No psychosocial contraindications to living organ donation were identified and  I support Roberta Penn s desire to be a Non-directed kidney donor.         Contact Information:   SOLOMON RICHARD, [facility]  Clinical  and Independent Donor Advocate  [employer]  Phone - [phone]  Pager - [phone]  [email or web address]      Time Spent: 60 minutes      Living Kidney Donor Consent per OPTN Policy 14.3.A for Independent Living Donor Advocate (NASIR)    Written assurance has been obtained from the potential donor that he/she:   Is willing to donate  Is free from inducement and coercion  Has been informed that the he/she may decline to donate at any time  Has been informed that  transplant centers must:   A) Offer donors an opportunity to discontinue the donor consent or evaluation process in a way that is protected and confidential  B) Provide an independent living donor advocate (NASIR) to assist the potential donor during this process    The following was presented to the potential donor in a language in which the potential donor is able to engage in meaningful dialogue:   Education and instruction about all phases of the living donation process including:   Consent  Medical and psychosocial evaluations  Pre and post operative care  Required post operative follow up  Disclosure that the [facility] will take all reasonable precautions to provide confidentiality for the donor/recipient  Disclosure that it is a federal crime for any person to knowingly acquire, obtain or otherwise transfer any human organ for valuable consideration  Disclosure that the [facility] must provide an independent living donor advocate (NASIR)  Disclosure that health information obtained during the evaluation is subject to the same regulations as all records and could reveal conditions that must be reported to local, state, or federal public health authorities  Disclosure that the [facility] is required to report living donor follow up information at 6 months, 1 year, and 2 years, and that the potential donor must commit to post operative follow up testing coordinated by the [facility]    Disclosure has been provided that these risks may be transient or permanent & include but are not limited to:  Potential psychosocial risks:  Problems with body image  Post-surgery depression or anxiety  Feelings of emotional distress or bereavement if recipient experiences any recurrent disease or in the event of the recipient s death  Impact of donation on the donor s lifestyle    Potential financial impacts:  Personal expenses of travel, housing, , lost wages related to donation might not be  reimbursed. However, resources may be available to defray some donation-related expenses   Need for life-long follow up at the donor s expense  Loss of employment or income  Negative impact on the ability to obtain future employment  Negative impact on the ability to obtain, maintain, or afford health, disability, and life insurance  Future health problems experienced by living donors following donation may not be covered by the recipient s insurance    Contact Information:  SOLOMON RICHARD, [facility]  Clinical  and Independent Donor Advocate  [facility]  Phone - [phone]  Pager - [phone]  [email or web address]      Time Spent: 60 minutes

## 2017-06-23 NOTE — MR AVS SNAPSHOT
After Visit Summary   6/23/2017    Roberta Penn    MRN: [id number]           Patient Information     Date Of Birth          1990        Visit Information        Provider Department      6/23/2017 8:30 AM UC 44 ATC; UC SPEC [facility] Specialty and Procedure        Today's Diagnoses     Transplant donor evaluation    -  1      Care Instructions    Dear Roberta Penn    Thank you for choosing [facility] ([facility]) for your kidney evaluation.  The following information is a summary of our appointment as well as important reminders.      Additional information: Your placement of peripheral IV and iohexol (omnipaque) injection.      We look forward in seeing you on your next appointment here at [facility].  Please don t hesitate to call us at [phone] to reschedule any of your appointments or to speak with one of the [facility] registered nurses.  It was a pleasure taking care of you today.    Sincerely,  Prema Franco, RN  [facility]  Specialty Infusion [facility] Procedure Center  [address] [place] [address]  [id number]  Phone:  [phone]    Patient Education    Iohexol (oral/rectal administration)    Iohexol injection (intrathecal)    Iohexol injection (Intra-uterine administration)    Iohexol injection (intravascular)  Iohexol injection (intravascular)  What is Iohexol injection (intravascular)?  IOHEXOL (Omnipaque ) is a radiopaque agent used to diagnose certain medical conditions. Iohexol will be given into your vein or artery by a health care provider. Sometimes, iohexol is injected into the knee or shoulder to see if there is anything wrong. Iohexol can also be used to look at your pancreas, bladder, to identify gallstones, to identify blood clots, or to see if you have a hernia. It is usually only given in a hospital or clinic. Iohexol contains iodine. The iodine in iohexol will  make the blood vessels and structures in your body opaque or white so they can be photographed by x-rays or CT scans. Usually several pictures are taken as iohexol moves through your body. Iohexol can be used to take pictures of the blood vessels around your heart, your brain, your kidney or other structures in your body. Generic iohexol injections are not yet available.  What should my health care professional know before I receive Iohexol?  They need to know if you have any of these conditions:    asthma    allergic tendencies including eczema, hayfever, or allergies to food or drugs    blood clots or strokes    dehydration or if you are taking diuretics such as furosemide (Lasix ) or bumetanide (Bumex )    diabetes mellitus    heart disease    heart failure    high blood pressure or pheochromocytoma    liver disease    lung disease    multiple myeloma    myasthenia gravis    kidney disease or decreased kidney function    seizures    sickle cell disease    thyroid disease    an unusual reaction to Iohexol, iodine, medicines, foods, dyes, or preservatives    pregnant or trying to get pregnant    breast-feeding  How should this medicine be used?  Iohexol is for injection or infusion into a vein, artery, joint, or other body part. It is given by a health-care provider in a hospital or clinic setting. Your health care provider may have special instructions for you before you have this procedure. Follow these directions carefully.  What if I miss a dose?  This does not apply.  What drug(s) may interact with Iohexol?    aldesleukin-2 (IL-2)    antiinflammatory drugs (NSAIDs, such as ibuprofen)    amiodarone    amphotericin B    certain antibiotics given by injection    certain medicines used to control high blood pressure    cisplatin    cyclosporine    entecavir    glipizide; metformin    glyburide; metformin    metformin    metformin; rosiglitazone    water pills  You may or may not be able to take your regular  medications during the time of your procedure. Ask your health care provider.  Tell your prescriber or health care professional about all other medicines you are taking, including non-prescription medicines, nutritional supplements, or herbal products. Also tell your prescriber or health care professional if you are a frequent user of drinks with caffeine or alcohol, if you smoke, or if you use illegal drugs. These may affect the way your medicine works. Check with your health care professional before stopping or starting any of your medicines.  What should I watch for while taking Iohexol?  Follow all instructions of your health care provider to properly prepare you for your test. Serious side effects are rare. After the test, drink plenty of water to avoid dehydration.  Follow all instructions of your prescriber for care after the test.  What side effects may I notice from receiving Iohexol?  Side effects that you should report to your prescriber or health care professional as soon as possible:    an unusual feeling of pain or warmth    change in vision    chest pain    chills or fever    decrease or increase in the amount of urine    dizziness    excessive sweating or intolerance to heat    fast or irregular heart beat or pulse    hives    hot flashes    itching    nausea or vomiting    nervousness    pain, swelling, or warmth where iohexol was injected    rash    seizures    swelling of your lips or face    tightness in chest or troubled breathing    wheezing  Side effects that usually do not require medical attention (report to your prescriber or health care professional if they continue or are bothersome):    anxiety    bitter or bad taste in mouth    bruising    headache    nose congestion    pain or tingling in your hands or feet  Where can I keep my medicine?  This does not apply. You will only receive iohexol in a hospital or clinic setting.  NOTE:This sheet is a summary. It may not cover all possible  information. If you have questions about this medicine, talk to your doctor, pharmacist, or health care provider. Copyright  2016 Gold Standard                  Follow-ups after your visit        Who to contact     If you have questions or need follow up information about today's clinic visit or your schedule please contact [facility] SPECIALTY AND PROCEDURE directly at [phone].  Normal or non-critical lab and imaging results will be communicated to you by G[employer]art, letter or phone within 4 business days after the clinic has received the results. If you do not hear from us within 7 days, please contact the clinic through G[employer]art or phone. If you have a critical or abnormal lab result, we will notify you by phone as soon as possible.  Submit refill requests through [employer] or call your pharmacy and they will forward the refill request to us. Please allow 3 business days for your refill to be completed.          Additional Information About Your Visit        GLSShart Information     [employer] gives you secure access to your electronic health record. If you see a primary care provider, you can also send messages to your care team and make appointments. If you have questions, please call your primary care clinic.  If you do not have a primary care provider, please call [phone] and they will assist you.        Care EveryWhere ID     This is your Care EveryWhere ID. This could be used by other organizations to access your [place] medical records  [id number]         Blood Pressure from Last 3 Encounters:   06/23/17 120/78   06/23/17 120/84   06/23/17 122/72    Weight from Last 3 Encounters:   06/23/17 88.4 kg (194 lb 14.4 oz)              We Performed the Following     Iohexol        Primary Care Provider    Provider Unknown       No address on file        Equal Access to Services     [facility] : dinah Doan, elizabeth murphy  gracie dicksonrob roberthcalderon lamonroeanshu leena. So [facility] [phone].    ATENCIÓN: Si habla gigi, tiene a zhao disposición servicios gratuitos de asistencia lingüística. Codey al [phone].    We comply with applicable federal civil rights laws and [place] laws. We do not discriminate on the basis of race, color, national origin, age, disability sex, sexual orientation or gender identity.            Thank you!     Thank you for choosing [facility] SPECIALTY AND PROCEDURE  for your care. Our goal is always to provide you with excellent care. Hearing back from our patients is one way we can continue to improve our services. Please take a few minutes to complete the written survey that you may receive in the mail after your visit with us. Thank you!             Your Updated Medication List - Protect others around you: Learn how to safely use, store and throw away your medicines at www.disposemymeds.org.          This list is accurate as of: 6/23/17  4:32 PM.  Always use your most recent med list.                   Brand Name Dispense Instructions for use Diagnosis    drospirenone-ethinyl estradiol 3-0.02 MG per tablet    SEAMUS     Take 1 tablet by mouth daily

## 2017-06-26 LAB
INTERPRETATION ECG - MUSE: NORMAL
M TB TUBERC IFN-G BLD QL: NEGATIVE
M TB TUBERC IFN-G/MITOGEN IGNF BLD: 0 IU/ML

## 2017-06-27 LAB
BSA: 2.07 M2
IOHEXOL CL UR+SERPL-VRATE: 1.83 MG/DL
IOHEXOL CL UR+SERPL-VRATE: 122 /1.73 M2
IOHEXOL CL UR+SERPL-VRATE: 146 ML/MIN
IOHEXOL CL UR+SERPL-VRATE: 5.17 MG/DL

## 2017-06-30 ENCOUNTER — TELEPHONE (OUTPATIENT)
Dept: TRANSPLANT | Facility: CLINIC | Age: 27
End: 2017-06-30

## 2017-06-30 NOTE — TELEPHONE ENCOUNTER
I called the patient to review the findings of her donor evaluation.  She will need to lost 15-20 pounds and demonstrate stable weight.  Recheck of UA.  I reviewed the other findings from her donor evaluation and answered her questions.  She understands the need for the weight reduction and plans to increase exercise.  She feels that she got some good tips from the nutritionist on food choices.  She is going to incorporate more meat in her diet.  She will keep me posted on the weight loss, she can come in to our lab for the UA.  I instructed her to not do it when she has her period.

## 2017-07-06 DIAGNOSIS — Z00.5 EXAMINATION OF POTENTIAL DONOR OF ORGAN AND TISSUE: Primary | ICD-10-CM

## 2018-01-21 ENCOUNTER — HEALTH MAINTENANCE LETTER (OUTPATIENT)
Age: 28
End: 2018-01-21

## 2020-03-04 NOTE — LETTER
6/23/2017       RE: Roberta Penn  [address]  [facility] [address]     Dear Colleague,    Thank you for referring your patient, Roberta Penn, to the [facility] SOLID ORGAN TRANSPLANT at [facility]. Please see a copy of my visit note below.    RE: Roberta Penn  [facility] #[id number]             I saw your patient, Roberta Penn, in consultation in our pretransplant clinic.  She was here at clinic for evaluation as a possible kidney donor.  She presented to clinic with her mother. Our donor coordinator shared our center-specific results with her.  We discussed:    (1) The risks of donation--including mortality (0.03% risk) and morbidity (approximately 1% risk of major morbidity).  We discussed the major reported causes of death after kidney donation (bleeding, infection, pulmonary embolism).  We discussed potential complications:  bleeding requiring reoperation, infection requiring reoperation, pneumonia, bowel obstruction, infection at the site of the incision, hernia at the site of the incision, deep vein thrombosis, deep vein thrombosis with associated pulmonary embolism, and urinary tract infection.  I told her I could not possibly name all of the risks, but that she was at risk for any complications that could occur in any operation.     We also discussed the long-term risks of living with one kidney.  We discussed the rare diseases that might affect only one kidney.  We talked about the new publications suggesting slightly increased long-term risk of ESRD after donor nephrectomy.  For people her age, there is really no data to show what the outcome will be at 50-60 years.  This is an important consideration.  We discussed the rare diseases that might affect having only one kidney.    (2) The hospital stay and the fact that if all goes well, discharge would occur within two to three days after donor nephrectomy.  We also discussed the fact that there would be no diet or fluid  The patient is a 29y Female complaining of abdominal pain. "restrictions (again if all went well), and that the only new medication that he would be on would be pain medication.  We discussed the fact that most patients are on Tylenol or something similar within five to six days of surgery.      (3) The recovery time after surgery and the restrictions that are necessary: a) no driving for a couple of weeks (or until she felt that his/her reaction would be adequate if she had to slam on the brakes; and b) no lifting over 10 pounds or any exercise that would stretch her abdominal muscles for six weeks (to allow the muscles to heal so that she doesn't develop a hernia).  We also discussed return to work, which could occur in approximately three to four weeks if she had a desk job or would require not returning to work for six weeks if the job required any heavy lifting or exercise.  We discussed the potential for not getting her pep and energy back for anywhere from two to six weeks after surgery and how there was a tremendous individual variation in return of full energy level.    (4) The concern about long distance travel for the first couple of weeks post-donation.  We discussed the risks of deep vein thrombosis associated with plane or car travel.  I recommended that, if flying, she should get up and walk around every 30-40 minutes; if driving she should ask the  to stop the car every 30-45 minutes so Ms. Penn could take a short walk.    (5) The fact that the recipient could be on a waiting for  donor transplant and would ultimately receive a kidney if Ms. Penn did not donate.      (6) The difference between nondirected and directed donors.  I explained our policy of allocation of nondirected kidneys to the number one person on the list.  I also explained that by being a nondirected donor, they would not have the opportunity to meet the recipient (at least for the first six months) and, therefore, would not have the opportunity to see the potential \"benefit\" " "of the donation.  I also mentioned that our recipients often have not sent \"thank-you\" letters to their nondirected donors.  This would be an important thing to recognize as she went forward in thinking about nondirected donation.  Finally, we discussed the option of entering the paired exchange pool--its advantage, increasing the number of transplants from the donation; the disadvantage, harder to schedule and more logistics.     We also discussed potential risks associated with future pregnancies.  We discussed the literature about pregnancy after donation, and also our local data.  I told her that data showed that the risks of post donation pregnancies were similar to the risks reported in the general population.  I also discussed our data on outcomes of pregnancies in women who had had pregnancies both pre- and post-donation.    Finally, the mother had numerous questions about the risks: the long-term outcome, the survival and rejection rate of transplantation in general, and the income of donor age on longevity of a transplant. I answered these as well as other questions.     I attempted to answer any remaining questions.  I also told her that should she have any questions, she should feel free to contact us.  We would be glad to answer any questions either over the phone or at another clinic visit.  Her transplant coordinator is Marti Shin and may be reached at [phone].  Thank you for the opportunity to see her.    I spent 70 minutes with this patient.  Over 95% of that time was spent in counseling and coordination of care.             Yours truly,               Melecio Marrero MD         Professor of Surgery         ([phone])      AJM/    Again, thank you for allowing me to participate in the care of your patient.      Sincerely,    Melecio Marrero MD      "

## 2020-03-10 ENCOUNTER — HEALTH MAINTENANCE LETTER (OUTPATIENT)
Age: 30
End: 2020-03-10

## 2020-12-27 ENCOUNTER — HEALTH MAINTENANCE LETTER (OUTPATIENT)
Age: 30
End: 2020-12-27

## 2021-04-24 ENCOUNTER — HEALTH MAINTENANCE LETTER (OUTPATIENT)
Age: 31
End: 2021-04-24

## 2021-10-09 ENCOUNTER — HEALTH MAINTENANCE LETTER (OUTPATIENT)
Age: 31
End: 2021-10-09

## 2022-05-21 ENCOUNTER — HEALTH MAINTENANCE LETTER (OUTPATIENT)
Age: 32
End: 2022-05-21

## 2022-09-11 ENCOUNTER — HEALTH MAINTENANCE LETTER (OUTPATIENT)
Age: 32
End: 2022-09-11

## 2023-06-03 ENCOUNTER — HEALTH MAINTENANCE LETTER (OUTPATIENT)
Age: 33
End: 2023-06-03

## 2025-03-26 ENCOUNTER — LAB REQUISITION (OUTPATIENT)
Dept: LAB | Facility: CLINIC | Age: 35
End: 2025-03-26

## 2025-03-26 DIAGNOSIS — Z12.4 ENCOUNTER FOR SCREENING FOR MALIGNANT NEOPLASM OF CERVIX: ICD-10-CM

## 2025-03-26 PROCEDURE — 87624 HPV HI-RISK TYP POOLED RSLT: CPT

## 2025-03-27 LAB
HPV HR 12 DNA CVX QL NAA+PROBE: NEGATIVE
HPV16 DNA CVX QL NAA+PROBE: NEGATIVE
HPV18 DNA CVX QL NAA+PROBE: NEGATIVE
HUMAN PAPILLOMA VIRUS FINAL DIAGNOSIS: NORMAL

## 2025-03-31 LAB
BKR AP ASSOCIATED HPV REPORT: NORMAL
BKR LAB AP GYN ADEQUACY: NORMAL
BKR LAB AP GYN INTERPRETATION: NORMAL
BKR LAB AP LMP: NORMAL
BKR LAB AP PREVIOUS ABNL DX: NORMAL
BKR LAB AP PREVIOUS ABNORMAL: NORMAL
PATH REPORT.COMMENTS IMP SPEC: NORMAL
PATH REPORT.COMMENTS IMP SPEC: NORMAL
PATH REPORT.RELEVANT HX SPEC: NORMAL